# Patient Record
Sex: FEMALE | Race: WHITE | NOT HISPANIC OR LATINO | Employment: UNEMPLOYED | URBAN - METROPOLITAN AREA
[De-identification: names, ages, dates, MRNs, and addresses within clinical notes are randomized per-mention and may not be internally consistent; named-entity substitution may affect disease eponyms.]

---

## 2023-08-04 ENCOUNTER — HOSPITAL ENCOUNTER (EMERGENCY)
Facility: HOSPITAL | Age: 53
Discharge: HOME | End: 2023-08-04
Attending: STUDENT IN AN ORGANIZED HEALTH CARE EDUCATION/TRAINING PROGRAM
Payer: MEDICAID

## 2023-08-04 VITALS
TEMPERATURE: 100 F | RESPIRATION RATE: 18 BRPM | SYSTOLIC BLOOD PRESSURE: 113 MMHG | HEART RATE: 92 BPM | OXYGEN SATURATION: 100 % | HEIGHT: 61 IN | WEIGHT: 120 LBS | BODY MASS INDEX: 22.66 KG/M2 | DIASTOLIC BLOOD PRESSURE: 73 MMHG

## 2023-08-04 DIAGNOSIS — K80.20 CHOLELITHIASIS WITHOUT CHOLECYSTITIS: ICD-10-CM

## 2023-08-04 DIAGNOSIS — R10.11 RUQ ABDOMINAL PAIN: Primary | ICD-10-CM

## 2023-08-04 DIAGNOSIS — K74.69 OTHER CIRRHOSIS OF LIVER: ICD-10-CM

## 2023-08-04 DIAGNOSIS — K59.00 CONSTIPATION, UNSPECIFIED CONSTIPATION TYPE: ICD-10-CM

## 2023-08-04 LAB
ALBUMIN SERPL-MCNC: 3.9 G/DL (ref 3.5–5)
ALBUMIN/GLOB SERPL: 1.1 RATIO (ref 1.1–2)
ALP SERPL-CCNC: 153 UNIT/L (ref 40–150)
ALT SERPL-CCNC: 24 UNIT/L (ref 0–55)
APPEARANCE UR: ABNORMAL
APTT PPP: 31.5 SECONDS
AST SERPL-CCNC: 40 UNIT/L (ref 5–34)
BACTERIA #/AREA URNS AUTO: ABNORMAL /HPF
BASOPHILS # BLD AUTO: 0.01 X10(3)/MCL
BASOPHILS NFR BLD AUTO: 0.2 %
BILIRUB UR QL STRIP.AUTO: NEGATIVE
BILIRUBIN DIRECT+TOT PNL SERPL-MCNC: 1 MG/DL
BUN SERPL-MCNC: 14.9 MG/DL (ref 9.8–20.1)
C TRACH DNA SPEC QL NAA+PROBE: NOT DETECTED
CALCIUM SERPL-MCNC: 9.3 MG/DL (ref 8.4–10.2)
CHLORIDE SERPL-SCNC: 107 MMOL/L (ref 98–107)
CO2 SERPL-SCNC: 23 MMOL/L (ref 22–29)
COLOR UR: YELLOW
CREAT SERPL-MCNC: 0.63 MG/DL (ref 0.55–1.02)
EOSINOPHIL # BLD AUTO: 0.02 X10(3)/MCL (ref 0–0.9)
EOSINOPHIL NFR BLD AUTO: 0.3 %
ERYTHROCYTE [DISTWIDTH] IN BLOOD BY AUTOMATED COUNT: 19.1 % (ref 11.5–17)
GFR SERPLBLD CREATININE-BSD FMLA CKD-EPI: >60 MLS/MIN/1.73/M2
GLOBULIN SER-MCNC: 3.4 GM/DL (ref 2.4–3.5)
GLUCOSE SERPL-MCNC: 110 MG/DL (ref 74–100)
GLUCOSE UR QL STRIP.AUTO: NORMAL
HCT VFR BLD AUTO: 36.6 % (ref 37–47)
HGB BLD-MCNC: 10.8 G/DL (ref 12–16)
HYALINE CASTS #/AREA URNS LPF: ABNORMAL /LPF
IMM GRANULOCYTES # BLD AUTO: 0.02 X10(3)/MCL (ref 0–0.04)
IMM GRANULOCYTES NFR BLD AUTO: 0.3 %
INR PPP: 1.1
KETONES UR QL STRIP.AUTO: NEGATIVE
LACTATE SERPL-SCNC: 1.2 MMOL/L (ref 0.5–2.2)
LEUKOCYTE ESTERASE UR QL STRIP.AUTO: 250
LIPASE SERPL-CCNC: 16 U/L
LYMPHOCYTES # BLD AUTO: 0.54 X10(3)/MCL (ref 0.6–4.6)
LYMPHOCYTES NFR BLD AUTO: 8.3 %
MCH RBC QN AUTO: 23.2 PG (ref 27–31)
MCHC RBC AUTO-ENTMCNC: 29.5 G/DL (ref 33–36)
MCV RBC AUTO: 78.5 FL (ref 80–94)
MONOCYTES # BLD AUTO: 0.46 X10(3)/MCL (ref 0.1–1.3)
MONOCYTES NFR BLD AUTO: 7.1 %
MUCOUS THREADS URNS QL MICRO: ABNORMAL /LPF
N GONORRHOEA DNA SPEC QL NAA+PROBE: NOT DETECTED
NEUTROPHILS # BLD AUTO: 5.45 X10(3)/MCL (ref 2.1–9.2)
NEUTROPHILS NFR BLD AUTO: 83.8 %
NITRITE UR QL STRIP.AUTO: NEGATIVE
NRBC BLD AUTO-RTO: 0 %
PH UR STRIP.AUTO: 6 [PH]
PLATELET # BLD AUTO: 174 X10(3)/MCL (ref 130–400)
PMV BLD AUTO: 9.7 FL (ref 7.4–10.4)
POTASSIUM SERPL-SCNC: 4.1 MMOL/L (ref 3.5–5.1)
PROT SERPL-MCNC: 7.3 GM/DL (ref 6.4–8.3)
PROT UR QL STRIP.AUTO: ABNORMAL
PROTHROMBIN TIME: 13.9 SECONDS (ref 11.4–14)
RBC # BLD AUTO: 4.66 X10(6)/MCL (ref 4.2–5.4)
RBC #/AREA URNS AUTO: ABNORMAL /HPF
RBC UR QL AUTO: NEGATIVE
SODIUM SERPL-SCNC: 140 MMOL/L (ref 136–145)
SOURCE (OHS): NORMAL
SP GR UR STRIP.AUTO: 1.03
SQUAMOUS #/AREA URNS LPF: ABNORMAL /HPF
TROPONIN I SERPL-MCNC: <0.01 NG/ML (ref 0–0.04)
UROBILINOGEN UR STRIP-ACNC: ABNORMAL
WBC # SPEC AUTO: 6.5 X10(3)/MCL (ref 4.5–11.5)
WBC #/AREA URNS AUTO: ABNORMAL /HPF

## 2023-08-04 PROCEDURE — 87040 BLOOD CULTURE FOR BACTERIA: CPT | Performed by: PHYSICIAN ASSISTANT

## 2023-08-04 PROCEDURE — 87591 N.GONORRHOEAE DNA AMP PROB: CPT | Performed by: PHYSICIAN ASSISTANT

## 2023-08-04 PROCEDURE — 25000003 PHARM REV CODE 250: Performed by: PHYSICIAN ASSISTANT

## 2023-08-04 PROCEDURE — 83605 ASSAY OF LACTIC ACID: CPT | Performed by: PHYSICIAN ASSISTANT

## 2023-08-04 PROCEDURE — 96375 TX/PRO/DX INJ NEW DRUG ADDON: CPT

## 2023-08-04 PROCEDURE — 99285 EMERGENCY DEPT VISIT HI MDM: CPT | Mod: 25

## 2023-08-04 PROCEDURE — 85025 COMPLETE CBC W/AUTO DIFF WBC: CPT | Performed by: PHYSICIAN ASSISTANT

## 2023-08-04 PROCEDURE — 85730 THROMBOPLASTIN TIME PARTIAL: CPT | Performed by: PHYSICIAN ASSISTANT

## 2023-08-04 PROCEDURE — 96374 THER/PROPH/DIAG INJ IV PUSH: CPT

## 2023-08-04 PROCEDURE — 85610 PROTHROMBIN TIME: CPT | Performed by: PHYSICIAN ASSISTANT

## 2023-08-04 PROCEDURE — 96361 HYDRATE IV INFUSION ADD-ON: CPT

## 2023-08-04 PROCEDURE — 63600175 PHARM REV CODE 636 W HCPCS: Performed by: PHYSICIAN ASSISTANT

## 2023-08-04 PROCEDURE — 93005 ELECTROCARDIOGRAM TRACING: CPT

## 2023-08-04 PROCEDURE — 83690 ASSAY OF LIPASE: CPT | Performed by: PHYSICIAN ASSISTANT

## 2023-08-04 PROCEDURE — 25500020 PHARM REV CODE 255: Performed by: PHYSICIAN ASSISTANT

## 2023-08-04 PROCEDURE — 80053 COMPREHEN METABOLIC PANEL: CPT | Performed by: PHYSICIAN ASSISTANT

## 2023-08-04 PROCEDURE — 84484 ASSAY OF TROPONIN QUANT: CPT | Performed by: PHYSICIAN ASSISTANT

## 2023-08-04 PROCEDURE — 81001 URINALYSIS AUTO W/SCOPE: CPT | Performed by: PHYSICIAN ASSISTANT

## 2023-08-04 RX ORDER — KETOROLAC TROMETHAMINE 30 MG/ML
15 INJECTION, SOLUTION INTRAMUSCULAR; INTRAVENOUS
Status: COMPLETED | OUTPATIENT
Start: 2023-08-04 | End: 2023-08-04

## 2023-08-04 RX ORDER — DICYCLOMINE HYDROCHLORIDE 10 MG/1
20 CAPSULE ORAL
Status: COMPLETED | OUTPATIENT
Start: 2023-08-04 | End: 2023-08-04

## 2023-08-04 RX ORDER — MORPHINE SULFATE 2 MG/ML
6 INJECTION, SOLUTION INTRAMUSCULAR; INTRAVENOUS ONCE
Status: DISCONTINUED | OUTPATIENT
Start: 2023-08-04 | End: 2023-08-04

## 2023-08-04 RX ORDER — ONDANSETRON 4 MG/1
4 TABLET, ORALLY DISINTEGRATING ORAL EVERY 8 HOURS PRN
Qty: 16 TABLET | Refills: 0 | OUTPATIENT
Start: 2023-08-04 | End: 2023-08-18

## 2023-08-04 RX ORDER — HYDROCODONE BITARTRATE AND ACETAMINOPHEN 5; 325 MG/1; MG/1
1 TABLET ORAL
Status: COMPLETED | OUTPATIENT
Start: 2023-08-04 | End: 2023-08-04

## 2023-08-04 RX ORDER — MORPHINE SULFATE 2 MG/ML
4 INJECTION, SOLUTION INTRAMUSCULAR; INTRAVENOUS
Status: COMPLETED | OUTPATIENT
Start: 2023-08-04 | End: 2023-08-04

## 2023-08-04 RX ORDER — LACTULOSE 10 G/15ML
20 SOLUTION ORAL 2 TIMES DAILY
Qty: 300 ML | Refills: 0 | Status: SHIPPED | OUTPATIENT
Start: 2023-08-04 | End: 2023-09-06 | Stop reason: DRUGHIGH

## 2023-08-04 RX ORDER — DICYCLOMINE HYDROCHLORIDE 20 MG/1
20 TABLET ORAL
Qty: 40 TABLET | Refills: 0 | Status: SHIPPED | OUTPATIENT
Start: 2023-08-04 | End: 2023-09-06 | Stop reason: ALTCHOICE

## 2023-08-04 RX ORDER — LACTULOSE 10 G/15ML
20 SOLUTION ORAL
Status: COMPLETED | OUTPATIENT
Start: 2023-08-04 | End: 2023-08-04

## 2023-08-04 RX ADMIN — LACTULOSE 20 G: 20 SOLUTION ORAL at 07:08

## 2023-08-04 RX ADMIN — IOHEXOL 100 ML: 350 INJECTION, SOLUTION INTRAVENOUS at 02:08

## 2023-08-04 RX ADMIN — HYDROCODONE BITARTRATE AND ACETAMINOPHEN 1 TABLET: 5; 325 TABLET ORAL at 12:08

## 2023-08-04 RX ADMIN — MORPHINE SULFATE 4 MG: 2 INJECTION, SOLUTION INTRAMUSCULAR; INTRAVENOUS at 03:08

## 2023-08-04 RX ADMIN — SODIUM CHLORIDE, POTASSIUM CHLORIDE, SODIUM LACTATE AND CALCIUM CHLORIDE 1000 ML: 600; 310; 30; 20 INJECTION, SOLUTION INTRAVENOUS at 11:08

## 2023-08-04 RX ADMIN — DICYCLOMINE HYDROCHLORIDE 20 MG: 10 CAPSULE ORAL at 07:08

## 2023-08-04 RX ADMIN — SODIUM CHLORIDE, POTASSIUM CHLORIDE, SODIUM LACTATE AND CALCIUM CHLORIDE 1000 ML: 600; 310; 30; 20 INJECTION, SOLUTION INTRAVENOUS at 03:08

## 2023-08-04 RX ADMIN — KETOROLAC TROMETHAMINE 15 MG: 30 INJECTION, SOLUTION INTRAMUSCULAR; INTRAVENOUS at 11:08

## 2023-08-04 NOTE — DISCHARGE INSTRUCTIONS
Take Bentyl before eating to help prevent abdominal pain.  Take lactulose daily until constipation resolves.  Surgery will call you to schedule an appointment for follow up in clinic.  You have an appointment with internal medicine clinic to establish a primary care physician on September 6th at 9:00 a.m..  After reviewing abdominal CT, radiologist recommends follow-up with CT scan liver mass protocol in 3 months.  Your primary care provider once established can order this outpatient imaging. They will call you if they can see you sooner.  Return to the emergency department if symptoms worsen.

## 2023-08-04 NOTE — ED PROVIDER NOTES
Encounter Date: 8/4/2023       History     Chief Complaint   Patient presents with    Emesis    Flank Pain     PT IN /AASI W CO BILAT FLANK PAIN W DYSURIA X 1 WK. STARTED VOMITING TODAY.  REPORTS HX OF CIRRHOSIS, LUPUS AND COPD.  CBG IN R[address]TE [address],  4MG ZOFRAN GIVEN IM, PTA.      52-year-old female with a history of cirrhosis, lupus and COPD, presents to the emergency department with complaints of bilateral flank pain, dysuria, generalized abdominal pain with the worst pain being in her right upper quadrant that radiates to her back x 1 week and vomiting that began today.  4 mg of Zofran IM given in route.  Patient states she recently [place] here [place] of state and has not established a primary care provider or a GI doctor in [place].  She rates her pain 8/10 and constant.  She denies fever, chills, diarrhea, shortness of breath, chest pain, dizziness.      The history is provided by the patient. No  was used.     Review of patient's allergies indicates:   Allergen Reactions    Gabapentin Swelling     History reviewed. No pertinent past medical history.  History reviewed. No pertinent surgical history.  History reviewed. No pertinent family history.  Social History     Tobacco Use    Smoking status: Every Day     Current packs/day: 0.00     Types: Cigarettes    Smokeless tobacco: Never   Substance Use Topics    Alcohol use: Not Currently     Comment: QUIT X 1 MONTH.    Drug use: Yes     Types: Marijuana     Review of Systems   Constitutional:  Negative for chills and fever.   Eyes: Negative.    Respiratory:  Negative for cough, chest tightness and shortness of breath.    Cardiovascular:  Negative for chest pain and palpitations.   Gastrointestinal:  Positive for abdominal pain, nausea and vomiting. Negative for diarrhea.   Genitourinary:  Positive for dysuria and flank pain (Bilateral). Negative for decreased urine volume.   Musculoskeletal:  Negative for back pain.   Skin:  Negative for rash  and wound.   Neurological:  Negative for dizziness, light-headedness and headaches.       Physical Exam     Initial Vitals [08/04/23 1037]   BP Pulse Resp Temp SpO2   113/73 92 18 99.5 °F (37.5 °C) 100 %      MAP       --         Physical Exam    Nursing note and vitals reviewed.  Constitutional: She appears well-developed and well-nourished.   HENT:   Head: Normocephalic and atraumatic.   Nose: Nose normal.   Mouth/Throat: Oropharynx is clear and moist.   Eyes: Conjunctivae are normal.   Neck: Neck supple.   Normal range of motion.  Cardiovascular:  Normal rate, regular rhythm, normal heart sounds and intact distal pulses.           Pulmonary/Chest: Breath sounds normal. No respiratory distress. She has no wheezes.   Abdominal: Abdomen is soft. Bowel sounds are normal. She exhibits distension (Ascites). There is abdominal tenderness (Generalized abdominal pain with the worst and right upper quadrant). There is no rebound and no guarding.   Musculoskeletal:         General: Normal range of motion.      Cervical back: Normal range of motion and neck supple.     Neurological: She is alert and oriented to person, place, and time. GCS score is 15. GCS eye subscore is 4. GCS verbal subscore is 5. GCS motor subscore is 6.   Skin: Skin is warm. Capillary refill takes less than 2 seconds.         ED Course   Procedures  Labs Reviewed   COMPREHENSIVE METABOLIC PANEL - Abnormal; Notable for the following components:       Result Value    Glucose Level 110 (*)     Alkaline Phosphatase 153 (*)     Aspartate Aminotransferase 40 (*)     All other components within normal limits   URINALYSIS, REFLEX TO URINE CULTURE - Abnormal; Notable for the following components:    Appearance, UA Turbid (*)     Protein, UA 1+ (*)     Urobilinogen, UA 2+ (*)     Leukocyte Esterase,  (*)     Bacteria, UA Few (*)     Squamous Epithelial Cells, UA Many (*)     Mucous, UA Few (*)     Hyaline Casts, UA 0-2 (*)     All other components within  normal limits   CBC WITH DIFFERENTIAL - Abnormal; Notable for the following components:    Hgb 10.8 (*)     Hct 36.6 (*)     MCV 78.5 (*)     MCH 23.2 (*)     MCHC 29.5 (*)     RDW 19.1 (*)     Lymph # 0.54 (*)     All other components within normal limits   LIPASE - Normal   APTT - Normal   PROTIME-INR - Normal   TROPONIN I - Normal   LACTIC ACID, PLASMA - Normal   CHLAMYDIA/GONORRHOEAE(GC), PCR    Narrative:     The Xpert CT/NG test, performed on the [employer] system is a qualitative in vitro real-time polymerase chain reaction (PCR) test for the automated detected and differentiation for genomic DNA from Chlamydia trachomatis (CT) and/or Neisseria gonorrhoeae (NG).   BLOOD CULTURE [facility]   BLOOD CULTURE [facility]   CBC W/ AUTO DIFFERENTIAL    Narrative:     The following orders were created for panel order CBC auto differential.  Procedure                               Abnormality         Status                     ---------                               -----------         ------                     CBC with Differential[[id number]]        Abnormal            Final result                 Please view results for these tests on the individual orders.   EXTRA TUBES    Narrative:     The following orders were created for panel order EXTRA TUBES.  Procedure                               Abnormality         Status                     ---------                               -----------         ------                     Red Top Hold[[id number]]                                     In process                 Lavender Top Hold[[id number]]                                In process                 Gold Top Hold[[id number]]                                    In process                 Pink Top Hold[[id number]]                                    In process                   Please view results for these tests on the individual orders.   RED TOP HOLD   LAVENDER TOP HOLD   GOLD TOP HOLD   PINK TOP HOLD     EKG Readings: (Independently  Interpreted)   Initial Reading: No STEMI. Rhythm: Normal Sinus Rhythm. Heart Rate: 90.   Nonspecific ST abnormality     ECG Results              EKG 12-lead (In process)  Result time 08/04/23 11:18:26      In process by Interface, Lab In [facility] (08/04/23 11:18:26)                   Narrative:    Test Reason : R10.9,    Vent. Rate : 090 BPM     Atrial Rate : 090 BPM     P-R Int : 144 ms          QRS Dur : 064 ms      QT Int : 376 ms       P-R-T Axes : 085 076 060 degrees     QTc Int : 459 ms    Normal sinus rhythm  Nonspecific ST abnormality  Abnormal ECG  No previous ECGs available    Referred By:             Confirmed By:                                   Imaging Results              US Abdomen Limited (Final result)  Result time 08/04/23 16:57:44      Final result by Paul Hoover MD (08/04/23 16:57:44)                   Impression:      1. Cholelithiasis without convincing sonographic evidence of acute cholecystitis.  2. No biliary ductal dilatation.  3. Cirrhosis with trace perihepatic fluid.      Electronically signed by: Paul Hoover  Date:    08/04/2023  Time:    16:57               Narrative:    EXAMINATION:  US ABDOMEN LIMITED    CLINICAL HISTORY:  Suspicious for acute cholecystitis on CT;    COMPARISON:  Concurrent CT.    FINDINGS:  Grayscale, color and spectral doppler evaluation of the right upper quadrant.    The pancreas is obscured.  Imaged portions of IV 6 normal in caliber.    Liver is upper limit of normal in size.  There is coarsened hepatic echotexture.  There is surface lobulation.  Normal hepatopetal flow is noted in the portal vein.    There are gallstones.  The gallbladder is distended but no significant gallbladder wall thickening.  The common bile duct is normal in caliber  and measures 5 mm.    Right kidney measures 10 cm in length. No hydronephrosis.    Trace perihepatic fluid.                                       CT Abdomen Pelvis With Contrast (Final result)  Result time 08/04/23  14:40:18      Final result by Argenis Feliciano MD (08/04/23 14:40:18)                   Impression:      Distended gallbladder with pericholecystic fluid and multiple gallstones.  Findings are suspicious for acute cholecystitis.  Ultrasound correlation may be beneficial    Enlarged nodular appearing liver consistent patient's history of cirrhosis.  Follow-up is recommended with CT scan liver mass protocol in 3 months.    1 cm x 1.1 cm nodule in the left adrenal gland which has indeterminate Hounsfield units.  This can be followed up with CT scan suggested above.      Electronically signed by: Argenis Feliciano  Date:    08/04/2023  Time:    14:40               Narrative:    EXAMINATION:  CT ABDOMEN PELVIS WITH CONTRAST    CLINICAL HISTORY:  Abdominal pain, acute, nonlocalized;hx of cirrhosis, generalized abdominal pain, vomiting;    TECHNIQUE:  Low dose axial images, sagittal and coronal reformations were obtained from the lung bases to the pubic symphysis following the IV administration of contrast. Automatic exposure control (AEC) is utilized to reduce patient radiation exposure.    COMPARISON:  None.    FINDINGS:  The lung bases are clear.    Liver is enlarged in size.  There are multiple areas of associated nodularity in the liver likely due to the patient's cirrhosis and regenerating nodules.  A focal dominant nodule is not seen.  Follow-up of this is recommended with CT scan liver mass protocol.    The gallbladder is distended.  There is pericholecystic fluid seen.  There are gallstones seen in the gallbladder.  If gallbladder pathology is suspected ultrasound correlation is recommended.    There is a small hiatal hernia.    The pancreas appears normal.  No pancreatic mass or lesion is seen.    The spleen shows no acute abnormality.    There is a 1 cm x 1.1 cm nodule in the left adrenal gland.  Hounsfield units are indeterminate..    The kidneys appear normal.  No hydronephrosis is seen.  No  hydroureter is seen.  No nephrolithiasis is seen.  No obvious ureteral stones are seen.    Urinary bladder appears grossly unremarkable.    No colitis is seen.  No diverticulitis is seen.  No obvious colonic mass or lesion is seen.    No free air is seen.  No free fluid is seen.                                       X-Ray Chest PA And Lateral (Final result)  Result time 08/04/23 11:50:14      Final result by Saeid Rodriguez MD (08/04/23 11:50:14)                   Impression:      No acute chest disease is identified.      Electronically signed by: Saeid Rodriguez  Date:    08/04/2023  Time:    11:50               Narrative:    EXAMINATION:  XR CHEST PA AND LATERAL    CLINICAL HISTORY:  flank pain;, .    FINDINGS:  No alveolar consolidation, effusion, or pneumothorax is seen.   The thoracic aorta is normal  cardiac silhouette, central pulmonary vessels and mediastinum are normal in size and are grossly unremarkable.   visualized osseous structures are grossly unremarkable..    Calcified granuloma identified on the right                                       Medications   lactated ringers bolus 1,000 mL (0 mLs Intravenous Stopped 8/4/23 1204)   ketorolac injection 15 mg (15 mg Intravenous Given 8/4/23 1131)   HYDROcodone-acetaminophen 5-325 mg per tablet 1 tablet (1 tablet Oral Given 8/4/23 1243)   iohexoL (OMNIPAQUE 350) injection 100 mL (100 mLs Intravenous Given 8/4/23 1429)   lactated ringers bolus 999 mL (0 mLs Intravenous Stopped 8/4/23 1630)   morphine injection 4 mg (4 mg Intravenous Given 8/4/23 1528)   lactulose 20 gram/30 mL solution Soln 20 g (20 g Oral Given 8/4/23 1906)   dicyclomine capsule 20 mg (20 mg Oral Given 8/4/23 1906)     Medical Decision Making:   Initial Assessment:   52-year-old female with a history of cirrhosis, lupus and COPD, presents to the emergency department with complaints of bilateral flank pain, dysuria, generalized abdominal pain with the worst pain being in her right  upper quadrant that radiates to her back x 1 week and vomiting that began today.    Differential Diagnosis:   Worsening ascites  Cholecystitis  Symptomatic cholelithiasis  UTI  Kidney stone  Pyelonephritis  STI  Peritonitis  Independently Interpreted Test(s):   I have ordered and independently interpreted X-rays - see summary below.       <> Summary of X-Ray Reading(s): Agree with radiology report, no acute chest findings, no infiltrates  I have ordered and independently interpreted EKG Reading(s) - see summary below       <> Summary of EKG Reading(s): HR: 90, No STEMI, normal sinus rhythm, nonspecific ST abnormality  Clinical Tests:   Lab Tests: Reviewed and Ordered  The following lab test(s) were unremarkable: Lipase, Lactate, Troponin, PTT, PT and Urinalysis       <> Summary of Lab: AST: 40, alkaline phosphatase: 153 otherwise CMP unremarkable    CBC shows mild anemia    Urinalysis:  250 leukocytes, few bacteria    Negative gonorrhea and chlamydia  Radiological Study: Ordered and Reviewed  Medical Tests: Ordered and Reviewed  ED Management:  X-ray chest:  No acute chest disease is identified    CT abdomen and pelvis with contrast:Distended gallbladder with pericholecystic fluid and multiple gallstones.  Findings are suspicious for acute cholecystitis.  Ultrasound correlation may be beneficial     Enlarged nodular appearing liver consistent patient's history of cirrhosis.  Follow-up is recommended with CT scan liver mass protocol in 3 months.     1 cm x 1.1 cm nodule in the left adrenal gland which has indeterminate Hounsfield units.  This can be followed up with CT scan suggested above.    Ultrasound abdomen limited ordered for further evaluation due to possible acute cholecystitis    General surgery consulted at 1518     US abdomen limited: Cholelithiasis without convincing sonographic evidence of acute cholecystitis. No biliary ductal dilatation. Cirrhosis with trace perihepatic fluid.    After review of  imaging and further evaluation, Surgery states she has cholelithiasis without cholecystitis.  They will call her to schedule an appointment for outpatient follow up in clinic.    Our  Manuela, scheduled her an appointment on September 6th with Internal Medicine Clinic to establish a primary care provider.  Her primary care provider can order outpatient CT scan liver mass protocol in 3 months per recommendation from Radiology.      Prescribed lactulose for constipation and Bentyl for abdominal discomfort.  Will avoid opioids due to constipation burden.    Discussed strict ED precautions.  Other:   I have discussed this case with another health care provider.       <> Summary of the Discussion: I consulted Dr. Joseph for a face-to-face evaluation with the patient prior to consulting surgery.  The patient is resting comfortably and in no acute distress.  She states that her symptoms have improved after treatment in Emergency Department. I personally discussed her test results and treatment plan.  Gave strict ED precautions, discussed specific conditions for return to the emergency department and importance of follow up with her primary care provider and surgery.  Patient voices understanding and agrees to the plan discussed. All patients' questions have been answered at this time.   She has remained hemodynamically stable throughout entire stay in ED and is stable for discharge home.               ED Course as of 08/04/23 1946   Fri Aug 04, 2023   1215 Leukocytes, UA(!): 250 [ER]   1215 Bacteria, UA(!): Few [ER]   1216 X-Ray Chest PA And Lateral  No acute chest disease is identified. [ER]   1450 CT Abdomen Pelvis With Contrast  Distended gallbladder with pericholecystic fluid and multiple gallstones.  Findings are suspicious for acute cholecystitis.  Ultrasound correlation may be beneficial     Enlarged nodular appearing liver consistent patient's history of cirrhosis.  Follow-up is recommended with CT scan  liver mass protocol in 3 months.     1 cm x 1.1 cm nodule in the left adrenal gland which has indeterminate Hounsfield units.  This can be followed up with CT scan suggested above. [ER]   1518 Consulted General surgery for acute cholecystitis [ER]   1637 AST(!): 40 [ER]   1637 Alkaline Phosphatase(!): 153 [ER]   1654 Awaiting ultrasound results to discuss the plan with the surgery team. [ER]   1709 US Abdomen Limited  1. Cholelithiasis without convincing sonographic evidence of acute cholecystitis.  2. No biliary ductal dilatation.  3. Cirrhosis with trace perihepatic fluid.    [ER]   1747 Awaiting for Surgery to re evaluate the patient and give further recommendations  [ER]      ED Course User Index  [ER] Noa Gardner PA                   Clinical Impression:   Final diagnoses:  [R10.11] RUQ abdominal pain (Primary)  [K59.00] Constipation, unspecified constipation type  [K74.69] Other cirrhosis of liver  [K80.20] Cholelithiasis without cholecystitis        ED Disposition Condition    Discharge Stable          ED Prescriptions       Medication Sig Dispense Start Date End Date Auth. Provider    lactulose (CHRONULAC) 20 gram/30 mL Soln Take 30 mLs (20 g total) by mouth 2 (two) times daily. for 5 days 300 mL 8/4/2023 8/9/2023 Noa Gardner PA    dicyclomine (BENTYL) 20 mg tablet Take 1 tablet (20 mg total) by mouth 4 (four) times daily before meals and nightly. for 10 days 40 tablet 8/4/2023 8/14/2023 Noa Gardner PA    ondansetron (ZOFRAN-ODT) 4 MG TbDL Take 1 tablet (4 mg total) by mouth every 8 (eight) hours as needed (nausea). 16 tablet 8/4/2023 -- Noa Gardner PA          Follow-up Information       Follow up With Specialties Details Why Contact Info Additional Information    [facility] - Internal Medicine Internal Medicine On 9/6/2023 @ 9:00 am [address] [facility] [id number] [phone] Internal Medicine Clinic Entrance #1    [facility] - Emergency Dept  Emergency Medicine  As needed, If symptoms worsen [address] [facility] [id number] [phone]     [facility] - General Surgery Services General Surgery  They will call you to schedule an apointment [address] [facility] [id number] [phone]              Noa Gardner PA  08/04/23 1946

## 2023-08-04 NOTE — MEDICAL/APP STUDENT
\[facility]\"" General Surgery Service  ADMIT / CONSULT / H&P NOTE  Date: 8/4/2023    CC: Emesis and flank pain     HPI:  Araseli Decker is a 52 y.o. female who presented to the ED 8/4/23 for evaluation of emesis and b/l flank pain. She reports the pain began 1 week ago, which she believed to be related to her kidneys. The pain began in the epigastric region/RUQ then spread to her right flank/back, occasionally involving her left flank and bilateral shoulders as well. Her last bowel movement was today, which was soft. Reports intermittent urination of small volume. Reports experiencing an episode of chest pain 2 days ago. Denies taking any blood thinners. History of heavy alcohol use resulting in cirrhosis for which she has a lifetime paracentesis order (last performed June 2023); last drank alcohol 1 month ago. Reports feeling confused lately. States she has had abdominal, flank, and shoulder pain in the past, which is most severe when her abdomen is distended and she is in need of paracentesis. States her last MELD score was 19.         ROS:  10 point ROS negative unless stated in HPI.     PMH:   SLE, cirrhosis (lifetime paracentesis order, last done in June 2023), COPD (prescribed 3 inhalers, which she has not been using recently), lung nodule, left Bakers cyst (reports it has been moving)    PSH:   History reviewed. No pertinent surgical history.    FamHx:   History reviewed. No pertinent family history.    SocHx:  Social History     Socioeconomic History    Marital status:    Tobacco Use    Smoking status: Every Day     Current packs/day: 0.50 (previously more)     Types: Cigarettes    Smokeless tobacco: Never   Substance and Sexual Activity    Alcohol use: Not Currently (previous heavy use)     Comment: QUIT X 1 MONTH.    Drug use: Yes     Types: Marijuana   From [place]. Currently staying at a shelter in [place]. Seeking to establish medical care in [place] as she would like to stay here.     Allergies:   Review of  "patient's allergies indicates:   Allergen Reactions    Gabapentin Swelling       Medications:  Spironolactone, protonix, thiamine, xanax        Objective:    VITAL SIGNS: 24 HR MIN & MAX LAST    Temp  Min: 99.5 °F (37.5 °C)  Max: 99.5 °F (37.5 °C)  99.5 °F (37.5 °C)        BP  Min: 113/73  Max: 113/73  113/73     Pulse  Min: 92  Max: 92  92     Resp  Min: 16  Max: 18  18    SpO2  Min: 100 %  Max: 100 %  100 %      HT: 5' 1" (154.9 cm)  WT: 54.4 kg (120 lb)  BMI: 22.7       Physical Exam:  Gen: NAD, AAOx3  Eyes: EOMI, sclera clear, lids normal  CV: extremities wwp, regular rate, palpable radials  Pulm: nonlabored, no increased wob, equal chest rise b/l, audible wheeze on exhalation    Abd: soft, distended, generalized minimal tenderness to palpation, reducible umbilical hernia  MSK: no deformity, joint ROM intact across all extremities  Skin: No rashes, wounds, or infections noted  Psych: Normal mood/affect. Judgement and insight intact.    Results  Recent Labs   Lab 08/04/23  1112   WBC 6.50   HGB 10.8*   HCT 36.6*      INR 1.1      K 4.1   CHLORIDE 107   CO2 23   BUN 14.9   CREATININE 0.63   GLUCOSE 110*   CALCIUM 9.3   ALKPHOS 153*   LIPASE 16   ALT: 24  AST: 40  Lactate: 1.2  Troponin I: <0.010  PT: 13.9  INR: 1.1  PTT: 31.5        Imaging/Other:  CT Abdomen Pelvis with Contrast 8/4/23:  Impression:  - Distended gallbladder with pericholecystic fluid and multiple gallstones.  Findings are suspicious for acute cholecystitis.  Ultrasound correlation may be beneficial  - Enlarged nodular appearing liver consistent patient's history of cirrhosis.  Follow-up is recommended with CT scan liver mass protocol in 3 months.  - 1 cm x 1.1 cm nodule in the left adrenal gland which has indeterminate Hounsfield units.  This can be followed up with CT scan suggested above.    CXR 8/4/23: No acute chest disease is identified.    EKG 8/4/23:  Normal sinus rhythm   Nonspecific ST abnormality   Abnormal ECG   No " previous ECGs available     A/P:   52 y.o. female presented to the ED 8/4/23 for evaluation of emesis and generalized abdominal/bilateral flank pain.     ***    Sp Stephen  [facility] Medical Student

## 2023-08-04 NOTE — CONSULTS
[facility] General Surgery Service  ADMIT / CONSULT / H&P NOTE  Date: 8/4/2023    CC: Emesis and flank pain     HPI:  Araseli Decker is a 52 y.o. female who presented to the ED 8/4/23 for evaluation of emesis and b/l flank pain. She reports the pain began 1 week ago, which she believed to be related to her kidneys. The pain began in the epigastric region/RUQ then spread to her right flank/back, occasionally involving her left flank and bilateral shoulders as well. Her last bowel movement was today, which was soft. Reports intermittent urination of small volume. Reports experiencing an episode of chest pain 2 days ago. Denies taking any blood thinners. History of heavy alcohol use resulting in cirrhosis for which she has a lifetime paracentesis order (last performed June 2023); last drank alcohol 1 month ago. Reports feeling confused lately. States she has had abdominal, flank, and shoulder pain in the past, which is most severe when her abdomen is distended and she is in need of paracentesis. States her last MELD score was 19.         ROS:  10 point ROS negative unless stated in HPI.     PMH:   SLE, cirrhosis (lifetime paracentesis order, last done in June 2023), COPD (prescribed 3 inhalers, which she has not been using recently), lung nodule, left Bakers cyst (reports it has been moving)    PSH:   History reviewed. No pertinent surgical history.    FamHx:   History reviewed. No pertinent family history.    SocHx:  Social History     Socioeconomic History    Marital status:    Tobacco Use    Smoking status: Every Day     Current packs/day: 0.50 (previously more)     Types: Cigarettes    Smokeless tobacco: Never   Substance and Sexual Activity    Alcohol use: Not Currently (previous heavy use)     Comment: QUIT X 1 MONTH.    Drug use: Yes     Types: Marijuana   From [place]. Currently staying at a shelter in [place]. Seeking to establish medical care in [place] as she would like to stay here.     Allergies:   Review of  "patient's allergies indicates:   Allergen Reactions    Gabapentin Swelling       Medications:  Spironolactone, protonix, thiamine, xanax        Objective:    VITAL SIGNS: 24 HR MIN & MAX LAST    Temp  Min: 99.5 °F (37.5 °C)  Max: 99.5 °F (37.5 °C)  99.5 °F (37.5 °C)        BP  Min: 113/73  Max: 113/73  113/73     Pulse  Min: 92  Max: 92  92     Resp  Min: 16  Max: 18  18    SpO2  Min: 100 %  Max: 100 %  100 %      HT: 5' 1" (154.9 cm)  WT: 54.4 kg (120 lb)  BMI: 22.7       Physical Exam:  Gen: NAD, AAOx3  Eyes: EOMI, sclera clear, lids normal  CV: extremities wwp, regular rate, palpable radials  Pulm: nonlabored, no increased wob, equal chest rise b/l, audible wheeze on exhalation    Abd: soft, distended, generalized minimal tenderness to palpation, reducible umbilical hernia  MSK: no deformity, joint ROM intact across all extremities  Skin: No rashes, wounds, or infections noted    Results  Recent Labs   Lab 08/04/23  1112   WBC 6.50   HGB 10.8*   HCT 36.6*      INR 1.1      K 4.1   CHLORIDE 107   CO2 23   BUN 14.9   CREATININE 0.63   GLUCOSE 110*   CALCIUM 9.3   ALKPHOS 153*   LIPASE 16     ALT: 24  AST: 40  Lactate: 1.2  Troponin I: <0.010  PT: 13.9  INR: 1.1  PTT: 31.5        Imaging/Other:  CT Abdomen Pelvis with Contrast 8/4/23:  Impression:  - Distended gallbladder with pericholecystic fluid and multiple gallstones.  Findings are suspicious for acute cholecystitis.  Ultrasound correlation may be beneficial  - Enlarged nodular appearing liver consistent patient's history of cirrhosis.  Follow-up is recommended with CT scan liver mass protocol in 3 months.  - 1 cm x 1.1 cm nodule in the left adrenal gland which has indeterminate Hounsfield units.  This can be followed up with CT scan suggested above.    CXR 8/4/23: No acute chest disease is identified.    EKG 8/4/23:  Normal sinus rhythm   Nonspecific ST abnormality   Abnormal ECG   No previous ECGs available     A/P:   52 y.o. female presented to " the ED 8/4/23 for evaluation of emesis and generalized abdominal/bilateral flank pain. Imaging shows dilated gallbladder, cholelithiasis without any convincing evidence of cholecystitis. She does have a significant history of liver cirrhosis with a history of multiple scheduled paracentesis and a MELD of 19, current MELD of 7. Her white count is normal, she has a normal bilirubin.    - patient does not have cholecystitis on clinical exam, she has a negative phan sign; further her imaging does not suggest cholecystitis, she does have liver cirrhosis and some perihepatic fluid  - Discharge per ED  - recommend primary care establishment to manage her multiple comorbidities  - will have patient follow up with us in clinic in a few weeks    Sp Stephen  [employer]    I have reviewed the note above; changes made where necessary. I concur with the documentation pertaining to Araseli Decker.    Eliseo Saldivar MD  [facility], [facility]

## 2023-08-04 NOTE — FIRST PROVIDER EVALUATION
Medical screening examination initiated.  I have conducted a focused provider triage encounter, findings are as follows:    Brief history of present illness:  Patient with pmhx of SLE, cirrhosis, COPD presents today c/o bilateral flank/flank pain as well as nausea/vomiting. She is currently staying at a shelter.     There were no vitals filed for this visit.    Pertinent physical exam:  Vitals stable. No jaundice.     Brief workup plan:  Labs     Preliminary workup initiated; this workup will be continued and followed by the physician or advanced practice provider that is assigned to the patient when roomed.

## 2023-08-07 ENCOUNTER — HOSPITAL ENCOUNTER (EMERGENCY)
Facility: HOSPITAL | Age: 53
Discharge: HOME | End: 2023-08-07
Attending: STUDENT IN AN ORGANIZED HEALTH CARE EDUCATION/TRAINING PROGRAM
Payer: MEDICAID

## 2023-08-07 VITALS
WEIGHT: 120 LBS | SYSTOLIC BLOOD PRESSURE: 127 MMHG | HEART RATE: 86 BPM | BODY MASS INDEX: 22.67 KG/M2 | DIASTOLIC BLOOD PRESSURE: 70 MMHG | TEMPERATURE: 98 F | RESPIRATION RATE: 18 BRPM | OXYGEN SATURATION: 100 %

## 2023-08-07 DIAGNOSIS — K80.20 CALCULUS OF GALLBLADDER WITHOUT CHOLECYSTITIS WITHOUT OBSTRUCTION: Primary | ICD-10-CM

## 2023-08-07 LAB
ALBUMIN SERPL-MCNC: 3.6 G/DL (ref 3.5–5)
ALBUMIN/GLOB SERPL: 1.2 RATIO (ref 1.1–2)
ALP SERPL-CCNC: 141 UNIT/L (ref 40–150)
ALT SERPL-CCNC: 19 UNIT/L (ref 0–55)
APPEARANCE UR: CLEAR
AST SERPL-CCNC: 30 UNIT/L (ref 5–34)
BACTERIA #/AREA URNS AUTO: ABNORMAL /HPF
BASOPHILS # BLD AUTO: 0.02 X10(3)/MCL
BASOPHILS NFR BLD AUTO: 0.6 %
BILIRUB UR QL STRIP.AUTO: NEGATIVE
BILIRUBIN DIRECT+TOT PNL SERPL-MCNC: 0.8 MG/DL
BUN SERPL-MCNC: 9.8 MG/DL (ref 9.8–20.1)
CALCIUM SERPL-MCNC: 9.4 MG/DL (ref 8.4–10.2)
CHLORIDE SERPL-SCNC: 113 MMOL/L (ref 98–107)
CO2 SERPL-SCNC: 21 MMOL/L (ref 22–29)
COLOR UR: YELLOW
CREAT SERPL-MCNC: 0.68 MG/DL (ref 0.55–1.02)
EOSINOPHIL # BLD AUTO: 0.09 X10(3)/MCL (ref 0–0.9)
EOSINOPHIL NFR BLD AUTO: 2.5 %
ERYTHROCYTE [DISTWIDTH] IN BLOOD BY AUTOMATED COUNT: 18.9 % (ref 11.5–17)
GFR SERPLBLD CREATININE-BSD FMLA CKD-EPI: >60 MLS/MIN/1.73/M2
GLOBULIN SER-MCNC: 3.1 GM/DL (ref 2.4–3.5)
GLUCOSE SERPL-MCNC: 93 MG/DL (ref 74–100)
GLUCOSE UR QL STRIP.AUTO: NORMAL
HCT VFR BLD AUTO: 33.2 % (ref 37–47)
HGB BLD-MCNC: 9.9 G/DL (ref 12–16)
HYALINE CASTS #/AREA URNS LPF: ABNORMAL /LPF
IMM GRANULOCYTES # BLD AUTO: 0.01 X10(3)/MCL (ref 0–0.04)
IMM GRANULOCYTES NFR BLD AUTO: 0.3 %
INR PPP: 1.1
KETONES UR QL STRIP.AUTO: NEGATIVE
LACTATE SERPL-SCNC: 1.3 MMOL/L (ref 0.5–2.2)
LEUKOCYTE ESTERASE UR QL STRIP.AUTO: NEGATIVE
LYMPHOCYTES # BLD AUTO: 1.31 X10(3)/MCL (ref 0.6–4.6)
LYMPHOCYTES NFR BLD AUTO: 36.6 %
MAGNESIUM SERPL-MCNC: 1.9 MG/DL (ref 1.6–2.6)
MCH RBC QN AUTO: 22.9 PG (ref 27–31)
MCHC RBC AUTO-ENTMCNC: 29.8 G/DL (ref 33–36)
MCV RBC AUTO: 76.9 FL (ref 80–94)
MONOCYTES # BLD AUTO: 0.41 X10(3)/MCL (ref 0.1–1.3)
MONOCYTES NFR BLD AUTO: 11.5 %
MUCOUS THREADS URNS QL MICRO: ABNORMAL /LPF
NEUTROPHILS # BLD AUTO: 1.74 X10(3)/MCL (ref 2.1–9.2)
NEUTROPHILS NFR BLD AUTO: 48.5 %
NITRITE UR QL STRIP.AUTO: NEGATIVE
NRBC BLD AUTO-RTO: 0 %
PH UR STRIP.AUTO: 6 [PH]
PLATELET # BLD AUTO: 139 X10(3)/MCL (ref 130–400)
PLATELETS.RETICULATED NFR BLD AUTO: 4.7 % (ref 0.9–11.2)
PMV BLD AUTO: 10.2 FL (ref 7.4–10.4)
POTASSIUM SERPL-SCNC: 4 MMOL/L (ref 3.5–5.1)
PROT SERPL-MCNC: 6.7 GM/DL (ref 6.4–8.3)
PROT UR QL STRIP.AUTO: ABNORMAL
PROTHROMBIN TIME: 14 SECONDS (ref 11.4–14)
RBC # BLD AUTO: 4.32 X10(6)/MCL (ref 4.2–5.4)
RBC #/AREA URNS AUTO: ABNORMAL /HPF
RBC UR QL AUTO: NEGATIVE
SODIUM SERPL-SCNC: 143 MMOL/L (ref 136–145)
SP GR UR STRIP.AUTO: 1.03
SQUAMOUS #/AREA URNS LPF: ABNORMAL /HPF
UROBILINOGEN UR STRIP-ACNC: ABNORMAL
WBC # SPEC AUTO: 3.58 X10(3)/MCL (ref 4.5–11.5)
WBC #/AREA URNS AUTO: ABNORMAL /HPF

## 2023-08-07 PROCEDURE — 85025 COMPLETE CBC W/AUTO DIFF WBC: CPT | Performed by: STUDENT IN AN ORGANIZED HEALTH CARE EDUCATION/TRAINING PROGRAM

## 2023-08-07 PROCEDURE — 63600175 PHARM REV CODE 636 W HCPCS: Performed by: STUDENT IN AN ORGANIZED HEALTH CARE EDUCATION/TRAINING PROGRAM

## 2023-08-07 PROCEDURE — 96372 THER/PROPH/DIAG INJ SC/IM: CPT | Performed by: STUDENT IN AN ORGANIZED HEALTH CARE EDUCATION/TRAINING PROGRAM

## 2023-08-07 PROCEDURE — 80053 COMPREHEN METABOLIC PANEL: CPT | Performed by: STUDENT IN AN ORGANIZED HEALTH CARE EDUCATION/TRAINING PROGRAM

## 2023-08-07 PROCEDURE — 81001 URINALYSIS AUTO W/SCOPE: CPT | Performed by: STUDENT IN AN ORGANIZED HEALTH CARE EDUCATION/TRAINING PROGRAM

## 2023-08-07 PROCEDURE — 96361 HYDRATE IV INFUSION ADD-ON: CPT

## 2023-08-07 PROCEDURE — 85610 PROTHROMBIN TIME: CPT | Performed by: STUDENT IN AN ORGANIZED HEALTH CARE EDUCATION/TRAINING PROGRAM

## 2023-08-07 PROCEDURE — 99285 EMERGENCY DEPT VISIT HI MDM: CPT | Mod: 25

## 2023-08-07 PROCEDURE — 96374 THER/PROPH/DIAG INJ IV PUSH: CPT

## 2023-08-07 PROCEDURE — 83735 ASSAY OF MAGNESIUM: CPT | Performed by: STUDENT IN AN ORGANIZED HEALTH CARE EDUCATION/TRAINING PROGRAM

## 2023-08-07 PROCEDURE — 83605 ASSAY OF LACTIC ACID: CPT | Performed by: STUDENT IN AN ORGANIZED HEALTH CARE EDUCATION/TRAINING PROGRAM

## 2023-08-07 PROCEDURE — 25000003 PHARM REV CODE 250: Performed by: STUDENT IN AN ORGANIZED HEALTH CARE EDUCATION/TRAINING PROGRAM

## 2023-08-07 PROCEDURE — 96375 TX/PRO/DX INJ NEW DRUG ADDON: CPT

## 2023-08-07 RX ORDER — KETOROLAC TROMETHAMINE 30 MG/ML
30 INJECTION, SOLUTION INTRAMUSCULAR; INTRAVENOUS
Status: COMPLETED | OUTPATIENT
Start: 2023-08-07 | End: 2023-08-07

## 2023-08-07 RX ORDER — NAPROXEN 500 MG/1
500 TABLET ORAL 2 TIMES DAILY PRN
Qty: 20 TABLET | Refills: 0 | OUTPATIENT
Start: 2023-08-07 | End: 2023-08-18

## 2023-08-07 RX ORDER — GLYCOPYRROLATE 1 MG/1
1 TABLET ORAL 3 TIMES DAILY PRN
Qty: 30 TABLET | Refills: 0 | Status: SHIPPED | OUTPATIENT
Start: 2023-08-07 | End: 2023-09-06 | Stop reason: ALTCHOICE

## 2023-08-07 RX ORDER — ONDANSETRON 2 MG/ML
4 INJECTION INTRAMUSCULAR; INTRAVENOUS
Status: COMPLETED | OUTPATIENT
Start: 2023-08-07 | End: 2023-08-07

## 2023-08-07 RX ORDER — MORPHINE SULFATE 2 MG/ML
4 INJECTION, SOLUTION INTRAMUSCULAR; INTRAVENOUS
Status: COMPLETED | OUTPATIENT
Start: 2023-08-07 | End: 2023-08-07

## 2023-08-07 RX ADMIN — SODIUM CHLORIDE 500 ML: 9 INJECTION, SOLUTION INTRAVENOUS at 03:08

## 2023-08-07 RX ADMIN — MORPHINE SULFATE 4 MG: 2 INJECTION, SOLUTION INTRAMUSCULAR; INTRAVENOUS at 03:08

## 2023-08-07 RX ADMIN — ONDANSETRON 4 MG: 2 INJECTION INTRAMUSCULAR; INTRAVENOUS at 03:08

## 2023-08-07 RX ADMIN — KETOROLAC TROMETHAMINE 30 MG: 30 INJECTION, SOLUTION INTRAMUSCULAR; INTRAVENOUS at 04:08

## 2023-08-07 NOTE — ED PROVIDER NOTES
Encounter Date: 8/7/2023       History     Chief Complaint   Patient presents with    Abdominal Pain     Hx of cirrhosis and gallstones, refused admission for sx last time, pt is still having pain and seeking admission.      Patient presents to the emergency department complaining of right upper quadrant pain.  She was actually seen 3 days ago in our ER for the same symptoms.  At that time she had a full workup evaluated by General surgery, she has cholelithiasis but no evidence of cholecystitis and was told to follow up in clinic.  She comes back complaining of the same pain.  She states it was not relieved with Motrin at home.  She denies any fevers nausea or vomiting or diarrhea.  She states she was previously on opiate medication but has recently moved here.    The history is provided by the patient.     Review of patient's allergies indicates:   Allergen Reactions    Gabapentin Swelling     No past medical history on file.  No past surgical history on file.  No family history on file.  Social History     Tobacco Use    Smoking status: Every Day     Current packs/day: 0.00     Types: Cigarettes    Smokeless tobacco: Never   Substance Use Topics    Alcohol use: Not Currently     Comment: QUIT X 1 MONTH.    Drug use: Yes     Types: Marijuana     Review of Systems   Constitutional:  Negative for chills and fever.   HENT:  Negative for congestion and sore throat.    Respiratory:  Negative for cough and shortness of breath.    Cardiovascular:  Negative for chest pain and palpitations.   Gastrointestinal:  Positive for abdominal pain. Negative for nausea.   Genitourinary:  Negative for dysuria and hematuria.   Musculoskeletal:  Negative for arthralgias and myalgias.   Neurological:  Negative for dizziness and weakness.       Physical Exam     Initial Vitals [08/07/23 1502]   BP Pulse Resp Temp SpO2   127/70 86 18 98.2 °F (36.8 °C) 100 %      MAP       --         Physical Exam    Nursing note and vitals  reviewed.  Constitutional: She appears well-developed and well-nourished.   HENT:   Head: Normocephalic and atraumatic.   Eyes: EOM are normal. Pupils are equal, round, and reactive to light.   Neck: Neck supple.   Normal range of motion.  Cardiovascular:  Normal rate and regular rhythm.           Pulmonary/Chest: Breath sounds normal. No respiratory distress.   Abdominal: Abdomen is soft. There is abdominal tenderness (tender in the right upper quadrant epigastric region). There is no rebound and no guarding.   Musculoskeletal:         General: No edema. Normal range of motion.      Cervical back: Normal range of motion and neck supple.     Neurological: She is alert and oriented to person, place, and time.   Skin: Skin is warm and dry.         ED Course   Procedures  Labs Reviewed   COMPREHENSIVE METABOLIC PANEL - Abnormal; Notable for the following components:       Result Value    Chloride 113 (*)     Carbon Dioxide 21 (*)     All other components within normal limits   URINALYSIS, REFLEX TO URINE CULTURE - Abnormal; Notable for the following components:    Protein, UA Trace (*)     Urobilinogen, UA 1+ (*)     Bacteria, UA Occ (*)     Squamous Epithelial Cells, UA Occ (*)     Mucous, UA Trace (*)     All other components within normal limits   CBC WITH DIFFERENTIAL - Abnormal; Notable for the following components:    WBC 3.58 (*)     Hgb 9.9 (*)     Hct 33.2 (*)     MCV 76.9 (*)     MCH 22.9 (*)     MCHC 29.8 (*)     RDW 18.9 (*)     Neut # 1.74 (*)     All other components within normal limits   PROTIME-INR - Normal   MAGNESIUM - Normal   LACTIC ACID, PLASMA - Normal   CBC W/ AUTO DIFFERENTIAL    Narrative:     The following orders were created for panel order CBC auto differential.  Procedure                               Abnormality         Status                     ---------                               -----------         ------                     CBC with Differential[[id number]]        Abnormal             Final result                 Please view results for these tests on the individual orders.   EXTRA TUBES    Narrative:     The following orders were created for panel order EXTRA TUBES.  Procedure                               Abnormality         Status                     ---------                               -----------         ------                     Gold Top Hold[[id number]]                                    In process                   Please view results for these tests on the individual orders.   GOLD TOP HOLD          Imaging Results              US Abdomen Limited_Gallbladder (Final result)  Result time 08/07/23 16:13:40      Final result by Matt Crawley MD (08/07/23 16:13:40)                   Narrative:    EXAMINATION  US ABDOMEN LIMITED_GALLBLADDER    CLINICAL HISTORY  RUQ pain, known stones;    TECHNIQUE  Multiplanar grayscale sonographic evaluation of the gallbladder and immediately adjacent right upper quadrant structures was performed.    COMPARISON  4 August 2023    FINDINGS  Exam quality: adequate for evaluation    Similar moderately dilated appearance of the gallbladder with layering echogenic sludge and multiple small stones.  No development of gallbladder wall thickening or pericholecystic fluid.  Sonographic Simpson sign is inconclusive secondary to administration of pain medication prior to imaging.    There are no findings to indicate abnormal biliary tree dilatation or obstructive CBD pathology.  The common bile duct measures approximately 4 mm in diameter.    Similar cirrhotic appearance of the liver.  No new or worsened focal abnormality of the visualized liver and right kidney identified.  Normal antegrade portal venous flow is again appreciated.    IMPRESSION  1. Similar findings of biliary sludge and cholelithiasis, without definite evidence of acute cholecystitis.  2. Remaining visualized right upper quadrant structures demonstrate no significant short interval  change.      Electronically signed by: Matt Crawley  Date:    08/07/2023  Time:    16:13                                     Medications   morphine injection 4 mg (4 mg Intravenous Given 8/7/23 1550)   sodium chloride 0.9% bolus 500 mL 500 mL (0 mLs Intravenous Stopped 8/7/23 1627)   ondansetron injection 4 mg (4 mg Intravenous Given 8/7/23 1550)   ketorolac injection 30 mg (30 mg Intramuscular Given 8/7/23 1651)     Medical Decision Making:   History:   Old Records Summarized: records from clinic visits.  Differential Diagnosis:   Cholecystitis, cholelithiasis, pancreatitis, among others  Clinical Tests:   Lab Tests: Ordered and Reviewed  The following lab test(s) were unremarkable: CBC, BMP and Urinalysis  Radiological Study: Ordered and Reviewed  ED Management:  Vital signs stable.  Lab workup is still reassuring, ultrasound shows no progression of her disease.  I am concerned the patient is just playing some pain seeking behavior she states she was previously on opiates and now she has not been on them since she is moved to a new stay.  We will avoid these at this time, we will prescribe Naprosyn and Robinul, and she is to follow back up with the general surgeons, return precautions were given                          Clinical Impression:   Final diagnoses:  [K80.20] Calculus of gallbladder without cholecystitis without obstruction (Primary)        ED Disposition Condition    Discharge Stable          ED Prescriptions       Medication Sig Dispense Start Date End Date Auth. Provider    naproxen (NAPROSYN) 500 MG tablet Take 1 tablet (500 mg total) by mouth 2 (two) times daily as needed (Pain). 20 tablet 8/7/2023 -- Merrill Joseph MD    glycopyrrolate (ROBINUL) 1 mg Tab Take 1 tablet (1 mg total) by mouth 3 (three) times daily as needed (Pain). 30 tablet 8/7/2023 9/6/2023 Merrill Joseph MD          Follow-up Information       Follow up With Specialties Details Why Contact Info    [facility]  Dept Emergency Medicine  If symptoms worsen [address] [facility] [address]  [phone]             Merrill Joseph MD  08/07/23 4342

## 2023-08-09 LAB
BACTERIA BLD CULT: NORMAL
BACTERIA BLD CULT: NORMAL

## 2023-08-18 ENCOUNTER — HOSPITAL ENCOUNTER (EMERGENCY)
Facility: HOSPITAL | Age: 53
Discharge: HOME | End: 2023-08-18
Attending: EMERGENCY MEDICINE
Payer: MEDICAID

## 2023-08-18 VITALS
OXYGEN SATURATION: 100 % | SYSTOLIC BLOOD PRESSURE: 110 MMHG | DIASTOLIC BLOOD PRESSURE: 68 MMHG | HEART RATE: 79 BPM | WEIGHT: 112.44 LBS | BODY MASS INDEX: 21.24 KG/M2 | RESPIRATION RATE: 18 BRPM | TEMPERATURE: 98 F

## 2023-08-18 DIAGNOSIS — M54.41 ACUTE RIGHT-SIDED LOW BACK PAIN WITH RIGHT-SIDED SCIATICA: Primary | ICD-10-CM

## 2023-08-18 PROCEDURE — 63600175 PHARM REV CODE 636 W HCPCS: Performed by: PHYSICIAN ASSISTANT

## 2023-08-18 PROCEDURE — 96372 THER/PROPH/DIAG INJ SC/IM: CPT | Performed by: PHYSICIAN ASSISTANT

## 2023-08-18 PROCEDURE — 99284 EMERGENCY DEPT VISIT MOD MDM: CPT

## 2023-08-18 RX ORDER — METHYLPREDNISOLONE 4 MG/1
TABLET ORAL
Qty: 1 EACH | Refills: 0 | Status: SHIPPED | OUTPATIENT
Start: 2023-08-18 | End: 2023-09-06 | Stop reason: ALTCHOICE

## 2023-08-18 RX ORDER — KETOROLAC TROMETHAMINE 30 MG/ML
30 INJECTION, SOLUTION INTRAMUSCULAR; INTRAVENOUS
Status: COMPLETED | OUTPATIENT
Start: 2023-08-18 | End: 2023-08-18

## 2023-08-18 RX ORDER — DICLOFENAC SODIUM 75 MG/1
75 TABLET, DELAYED RELEASE ORAL 2 TIMES DAILY
Qty: 20 TABLET | Refills: 0 | Status: SHIPPED | OUTPATIENT
Start: 2023-08-18 | End: 2023-09-06 | Stop reason: ALTCHOICE

## 2023-08-18 RX ADMIN — KETOROLAC TROMETHAMINE 30 MG: 30 INJECTION, SOLUTION INTRAMUSCULAR; INTRAVENOUS at 04:08

## 2023-08-18 NOTE — ED PROVIDER NOTES
Encounter Date: 8/18/2023       History     Chief Complaint   Patient presents with    Back Pain     PT IN /AASI W CO LOWER BACK PAIN W RAD DOWN LEGS X 3 WKS.  PT HOMELESS, REQUESTING FOOD AND SHOWER.  SANDWICH TRAY GIVEN.      51 YO WF in ER with complaints of lower back pain going down the right leg X 3 weeks. Denies injury/trauma, bladder/bowel dysfunction, fever, chills, chest pain, SOB, abdominal pain, N/V/D, HA or dizziness. No other complaints.     The history is provided by the patient.     Review of patient's allergies indicates:   Allergen Reactions    Gabapentin Swelling     History reviewed. No pertinent past medical history.  History reviewed. No pertinent surgical history.  History reviewed. No pertinent family history.  Social History     Tobacco Use    Smoking status: Every Day     Current packs/day: 0.00     Types: Cigarettes    Smokeless tobacco: Never   Substance Use Topics    Alcohol use: Yes     Comment: DAILY    Drug use: Yes     Types: Marijuana     Review of Systems   Constitutional:  Negative for chills and fever.   HENT:  Negative for congestion and sore throat.    Respiratory:  Negative for shortness of breath.    Cardiovascular:  Negative for chest pain.   Gastrointestinal:  Negative for abdominal pain, diarrhea, nausea and vomiting.   Genitourinary:  Negative for dysuria.   Musculoskeletal:  Positive for back pain.   Skin:  Negative for rash.   Neurological:  Negative for dizziness, weakness, light-headedness and headaches.   Hematological:  Does not bruise/bleed easily.   All other systems reviewed and are negative.      Physical Exam     Initial Vitals [08/18/23 1437]   BP Pulse Resp Temp SpO2   110/68 79 18 97.9 °F (36.6 °C) 100 %      MAP       --         Physical Exam    Nursing note and vitals reviewed.  Constitutional: She appears well-developed and well-nourished. She is not diaphoretic. No distress.   HENT:   Head: Normocephalic and atraumatic.   Nose: Nose normal.   Eyes:  Conjunctivae are normal.   Cardiovascular:  Normal rate and regular rhythm.           Pulmonary/Chest: Breath sounds normal.   Musculoskeletal:      Lumbar back: Spasms and tenderness present. No swelling, edema or bony tenderness. Decreased range of motion.        Back:      Neurological: She is alert and oriented to person, place, and time.   Skin: Skin is warm and dry.   Psychiatric: She has a normal mood and affect.         ED Course   Procedures  Labs Reviewed - No data to display       Imaging Results    None          Medications   ketorolac injection 30 mg (has no administration in time range)     Medical Decision Making  Risk  Prescription drug management.                               Clinical Impression:   Final diagnoses:  [M54.41] Acute right-sided low back pain with right-sided sciatica (Primary)        ED Disposition Condition    Discharge Stable          ED Prescriptions       Medication Sig Dispense Start Date End Date Auth. Provider    diclofenac (VOLTAREN) 75 MG EC tablet Take 1 tablet (75 mg total) by mouth 2 (two) times daily. 20 tablet 8/18/2023 -- Bolivar Serna PA    methylPREDNISolone (MEDROL DOSEPACK) 4 mg tablet Take as package instructs 1 each 8/18/2023 -- Bolivar Serna PA          Follow-up Information       Follow up With Specialties Details Why Contact Info    [facility] Dept Emergency Medicine In 3 days As needed, If symptoms worsen [facility] [facility] [address] [phone]    [facility] Internal Medicine On 9/6/2023 keep scheduled appointment [address] [facility] [address] [phone]             Bolivar Serna PA  08/18/23 3372

## 2023-08-19 ENCOUNTER — HOSPITAL ENCOUNTER (EMERGENCY)
Facility: HOSPITAL | Age: 53
Discharge: HOME | End: 2023-08-19
Attending: EMERGENCY MEDICINE
Payer: MEDICAID

## 2023-08-19 VITALS
DIASTOLIC BLOOD PRESSURE: 65 MMHG | HEART RATE: 75 BPM | RESPIRATION RATE: 20 BRPM | BODY MASS INDEX: 24.55 KG/M2 | OXYGEN SATURATION: 98 % | HEIGHT: 61 IN | SYSTOLIC BLOOD PRESSURE: 103 MMHG | WEIGHT: 130 LBS | TEMPERATURE: 98 F

## 2023-08-19 DIAGNOSIS — R53.1 WEAKNESS: ICD-10-CM

## 2023-08-19 LAB
ALBUMIN SERPL-MCNC: 4 G/DL (ref 3.5–5)
ALBUMIN/GLOB SERPL: 1.4 RATIO (ref 1.1–2)
ALP SERPL-CCNC: 134 UNIT/L (ref 40–150)
ALT SERPL-CCNC: 25 UNIT/L (ref 0–55)
AMPHET UR QL SCN: POSITIVE
AST SERPL-CCNC: 40 UNIT/L (ref 5–34)
BARBITURATE SCN PRESENT UR: NEGATIVE
BASOPHILS # BLD AUTO: 0.04 X10(3)/MCL
BASOPHILS NFR BLD AUTO: 1 %
BENZODIAZ UR QL SCN: POSITIVE
BILIRUB SERPL-MCNC: 1.8 MG/DL
BUN SERPL-MCNC: 13.1 MG/DL (ref 9.8–20.1)
CALCIUM SERPL-MCNC: 9.6 MG/DL (ref 8.4–10.2)
CANNABINOIDS UR QL SCN: POSITIVE
CHLORIDE SERPL-SCNC: 109 MMOL/L (ref 98–107)
CK SERPL-CCNC: 132 U/L (ref 29–168)
CO2 SERPL-SCNC: 24 MMOL/L (ref 22–29)
COCAINE UR QL SCN: NEGATIVE
CREAT SERPL-MCNC: 0.63 MG/DL (ref 0.55–1.02)
EOSINOPHIL # BLD AUTO: 0.12 X10(3)/MCL (ref 0–0.9)
EOSINOPHIL NFR BLD AUTO: 3.1 %
ERYTHROCYTE [DISTWIDTH] IN BLOOD BY AUTOMATED COUNT: 21.1 % (ref 11.5–17)
ETHANOL SERPL-MCNC: <10 MG/DL
FENTANYL UR QL SCN: NEGATIVE
GFR SERPLBLD CREATININE-BSD FMLA CKD-EPI: >60 MLS/MIN/1.73/M2
GLOBULIN SER-MCNC: 2.9 GM/DL (ref 2.4–3.5)
GLUCOSE SERPL-MCNC: 103 MG/DL (ref 74–100)
HCT VFR BLD AUTO: 34 % (ref 37–47)
HGB BLD-MCNC: 10.6 G/DL (ref 12–16)
IMM GRANULOCYTES # BLD AUTO: 0.01 X10(3)/MCL (ref 0–0.04)
IMM GRANULOCYTES NFR BLD AUTO: 0.3 %
LYMPHOCYTES # BLD AUTO: 1.41 X10(3)/MCL (ref 0.6–4.6)
LYMPHOCYTES NFR BLD AUTO: 36.4 %
MCH RBC QN AUTO: 23 PG (ref 27–31)
MCHC RBC AUTO-ENTMCNC: 31.2 G/DL (ref 33–36)
MCV RBC AUTO: 73.9 FL (ref 80–94)
MDMA UR QL SCN: NEGATIVE
MONOCYTES # BLD AUTO: 0.74 X10(3)/MCL (ref 0.1–1.3)
MONOCYTES NFR BLD AUTO: 19.1 %
NEUTROPHILS # BLD AUTO: 1.55 X10(3)/MCL (ref 2.1–9.2)
NEUTROPHILS NFR BLD AUTO: 40.1 %
NRBC BLD AUTO-RTO: 0 %
OPIATES UR QL SCN: NEGATIVE
PCP UR QL: NEGATIVE
PH UR: 6 [PH] (ref 3–11)
PLATELET # BLD AUTO: 202 X10(3)/MCL (ref 130–400)
PMV BLD AUTO: 10.1 FL (ref 7.4–10.4)
POTASSIUM SERPL-SCNC: 3.8 MMOL/L (ref 3.5–5.1)
PROT SERPL-MCNC: 6.9 GM/DL (ref 6.4–8.3)
RBC # BLD AUTO: 4.6 X10(6)/MCL (ref 4.2–5.4)
SODIUM SERPL-SCNC: 143 MMOL/L (ref 136–145)
TROPONIN I SERPL-MCNC: <0.01 NG/ML (ref 0–0.04)
WBC # SPEC AUTO: 3.87 X10(3)/MCL (ref 4.5–11.5)

## 2023-08-19 PROCEDURE — 82077 ASSAY SPEC XCP UR&BREATH IA: CPT | Performed by: EMERGENCY MEDICINE

## 2023-08-19 PROCEDURE — 93005 ELECTROCARDIOGRAM TRACING: CPT

## 2023-08-19 PROCEDURE — 80307 DRUG TEST PRSMV CHEM ANLYZR: CPT | Performed by: EMERGENCY MEDICINE

## 2023-08-19 PROCEDURE — 84484 ASSAY OF TROPONIN QUANT: CPT | Performed by: EMERGENCY MEDICINE

## 2023-08-19 PROCEDURE — 99284 EMERGENCY DEPT VISIT MOD MDM: CPT

## 2023-08-19 PROCEDURE — 85025 COMPLETE CBC W/AUTO DIFF WBC: CPT | Performed by: EMERGENCY MEDICINE

## 2023-08-19 PROCEDURE — 82550 ASSAY OF CK (CPK): CPT | Performed by: EMERGENCY MEDICINE

## 2023-08-19 PROCEDURE — 93010 EKG 12-LEAD: ICD-10-PCS | Mod: ,,, | Performed by: INTERNAL MEDICINE

## 2023-08-19 PROCEDURE — 93010 ELECTROCARDIOGRAM REPORT: CPT | Mod: ,,, | Performed by: INTERNAL MEDICINE

## 2023-08-19 PROCEDURE — 80053 COMPREHEN METABOLIC PANEL: CPT | Performed by: EMERGENCY MEDICINE

## 2023-08-19 NOTE — DISCHARGE INSTRUCTIONS

## 2023-08-19 NOTE — ED PROVIDER NOTES
"Encounter Date: 8/19/2023       History     Chief Complaint   Patient presents with    Fatigue     Fatigue and body aches "for a while". Pt reports "I just feel tired". Denies fever or any other complaints. Denies etoh or drug use     Patient was sleeping on the sidewalk 911 was called by a bystander who did not stay around patient was picked up said she was tired and to bring her to the emergency room.  Patient states she is just tired no chest pain or shortness of breath no nausea no vomiting, patient states she is eating well states that she does not drink or use drugs has been homeless for a little while.    The history is provided by the EMS personnel.     Review of patient's allergies indicates:   Allergen Reactions    Gabapentin Swelling     No past medical history on file.  No past surgical history on file.  No family history on file.  Social History     Tobacco Use    Smoking status: Every Day     Current packs/day: 0.00     Types: Cigarettes    Smokeless tobacco: Never   Substance Use Topics    Alcohol use: Yes     Comment: DAILY    Drug use: Yes     Types: Marijuana     Review of Systems   Unable to perform ROS: Other (Refusal)       Physical Exam     Initial Vitals [08/19/23 0536]   BP Pulse Resp Temp SpO2   102/78 68 18 98.4 °F (36.9 °C) 99 %      MAP       --         Physical Exam    Constitutional: She appears well-developed and well-nourished.   HENT:   Head: Normocephalic and atraumatic.   Mouth/Throat: Oropharynx is clear and moist.   Eyes: Conjunctivae are normal. Pupils are equal, round, and reactive to light.   Neck: Neck supple.   Normal range of motion.  Cardiovascular:  Normal rate, regular rhythm and normal heart sounds.           Pulmonary/Chest: Breath sounds normal. She has no wheezes. She has no rhonchi. She has no rales.   Abdominal: Abdomen is soft. Bowel sounds are normal.   Musculoskeletal:         General: Normal range of motion.      Cervical back: Normal range of motion and neck " supple.     Neurological: She is alert and oriented to person, place, and time. GCS score is 15. GCS eye subscore is 4. GCS verbal subscore is 5. GCS motor subscore is 6.   Patient wakes up answers all question and then goes back to sleep   Skin: Skin is warm and dry. Capillary refill takes less than 2 seconds.   Psychiatric: She has a normal mood and affect.         ED Course   Procedures  Labs Reviewed   COMPREHENSIVE METABOLIC PANEL - Abnormal; Notable for the following components:       Result Value    Chloride 109 (*)     Glucose Level 103 (*)     Bilirubin Total 1.8 (*)     Aspartate Aminotransferase 40 (*)     All other components within normal limits   DRUG SCREEN, URINE (BEAKER) - Abnormal; Notable for the following components:    Amphetamines, Urine Positive (*)     Benzodiazepine, Urine Positive (*)     Cannabinoids, Urine Positive (*)     All other components within normal limits    Narrative:     Cut off concentrations:    Amphetamines - 1000 ng/ml  Barbiturates - 200 ng/ml  Benzodiazepine - 200 ng/ml  Cannabinoids (THC) - 50 ng/ml  Cocaine - 300 ng/ml  Fentanyl - 1.0 ng/ml  MDMA - 500 ng/ml  Opiates - 300 ng/ml   Phencyclidine (PCP) - 25 ng/ml    Specimen submitted for drug analysis and tested for pH and specific gravity in order to evaluate sample integrity. Suspect tampering if specific gravity is <1.003 and/or pH is not within the range of 4.5 - 8.0  False negatives may result form substances such as bleach added to urine.  False positives may result for the presence of a substance with similar chemical structure to the drug or its metabolite.    This test provides only a PRELIMINARY analytical test result. A more specific alternate chemical method must be used in order to obtain a confirmed analytical result. Gas chromatography/mass spectrometry (GC/MS) is the preferred confirmatory method. Other chemical confirmation methods are available. Clinical consideration and professional judgement should  be applied to any drug of abuse test result, particularly when preliminary positive results are used.    Positive results will be confirmed only at the physicians request. Unconfirmed screening results are to be used only for medical purposes (treatment).        CBC WITH DIFFERENTIAL - Abnormal; Notable for the following components:    WBC 3.87 (*)     Hgb 10.6 (*)     Hct 34.0 (*)     MCV 73.9 (*)     MCH 23.0 (*)     MCHC 31.2 (*)     RDW 21.1 (*)     Neut # 1.55 (*)     All other components within normal limits   ALCOHOL,MEDICAL (ETHANOL) - Normal   TROPONIN I - Normal   CK - Normal   CBC W/ AUTO DIFFERENTIAL    Narrative:     The following orders were created for panel order CBC auto differential.  Procedure                               Abnormality         Status                     ---------                               -----------         ------                     CBC with Differential[[id number]]        Abnormal            Final result                 Please view results for these tests on the individual orders.          Imaging Results    None          Medications - No data to display  Medical Decision Making  Amount and/or Complexity of Data Reviewed  Labs: ordered.               ED Course as of 08/20/23 0453   Sat Aug 19, 2023   0946 Patient awake alert will check labs secondary to patient's age and complaints will also feed patient anticipate discharge [FK]      ED Course User Index  [FK] Shai Blackmon III, MD                    Clinical Impression:   Final diagnoses:  [R53.1] Weakness        ED Disposition Condition    Discharge Stable          ED Prescriptions    None       Follow-up Information       Follow up With Specialties Details Why Contact [facility]    Clinics, [facility]  Schedule an appointment as soon as possible for a visit   [address] [facility] [address] [phone]      [facility] - Emergency Dept Emergency Medicine  If symptoms worsen [address]  [facility] [address]  [phone]             Shai Blackmon III, MD  08/20/23 4205

## 2023-08-19 NOTE — ED NOTES
Pt ambulatory on discharge with steady gait. Provided discharge instructions and education. Pt questions answered. Ice pack given for back soreness.

## 2023-08-21 ENCOUNTER — PATIENT OUTREACH (OUTPATIENT)
Dept: EMERGENCY MEDICINE | Facility: HOSPITAL | Age: 53
End: 2023-08-21
Payer: MEDICAID

## 2023-08-22 ENCOUNTER — OFFICE VISIT (OUTPATIENT)
Dept: SURGERY | Facility: CLINIC | Age: 53
End: 2023-08-22
Payer: MEDICAID

## 2023-08-22 VITALS
OXYGEN SATURATION: 100 % | HEIGHT: 61 IN | DIASTOLIC BLOOD PRESSURE: 62 MMHG | WEIGHT: 116 LBS | HEART RATE: 69 BPM | RESPIRATION RATE: 19 BRPM | TEMPERATURE: 98 F | SYSTOLIC BLOOD PRESSURE: 97 MMHG | BODY MASS INDEX: 21.9 KG/M2

## 2023-08-22 DIAGNOSIS — R10.9 ABDOMINAL PAIN, UNSPECIFIED ABDOMINAL LOCATION: Primary | ICD-10-CM

## 2023-08-22 PROCEDURE — 99213 OFFICE O/P EST LOW 20 MIN: CPT | Mod: PBBFAC

## 2023-08-22 RX ORDER — LACTULOSE 10 G/15ML
30 SOLUTION ORAL; RECTAL 2 TIMES DAILY
COMMUNITY
Start: 2023-08-09 | End: 2023-09-06 | Stop reason: SDUPTHER

## 2023-08-22 RX ORDER — ALPRAZOLAM 0.5 MG/1
0.5 TABLET ORAL 3 TIMES DAILY PRN
COMMUNITY
Start: 2023-08-10 | End: 2023-09-06
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
## 2023-08-22 NOTE — PROGRESS NOTES
Pt seen by Dr. Long; Pt instructed to follow up as needed; Discharge paperwork given w/pt verbalizing understanding

## 2023-08-23 ENCOUNTER — PATIENT OUTREACH (OUTPATIENT)
Dept: EMERGENCY MEDICINE | Facility: HOSPITAL | Age: 53
End: 2023-08-23
Payer: MEDICAID

## 2023-08-23 NOTE — PROGRESS NOTES
I have reviewed the notes, assessments, and/or procedures performed by the resident, I concur with her/his documentation of Araseli Miranda.     Sandrita Hammond MD

## 2023-08-23 NOTE — PATIENT INSTRUCTIONS
Why Should I Have My Own Doctor or Nurse Practitioner (PCP) to Take Care of Me  What is a PCP (Primary Care Provider)?    A primary care provider is a doctor or nurse practitioner who you can call for an appointment and will see you when you are sick.    You will also be seen at scheduled appointment times during the year to check on your diabetes, or high blood pressure, or heart disease.    Why see the same PCP (doctor/nurse practitioner)?    You can be seen faster when you are sick           You, the PCP (doctor/nurse practitioner) and the office staff get to know each other; you begin to trust them to care for you. You take part in your health choices.   All of you together are a team.    Your medicine is looked at every time you visit, to be sure you are taking the medicine, as the PCP (doctor/nurse practitioner) ordered.    Your PCP (doctor/nurse practitioner) and their staff help keep you healthy and out of the hospital.  They can catch sicknesses earlier by ordering tests once a year to stop or prevent the sickness from getting worse.      Your PCP (doctor/nurse practitioner) can send you to providers who specialize (heart/bone/lung) if you need.  They and their office staff help keep track of your seeing other providers (doctors/nurse practitioners) and tests (CT/ MRIs/ X Rays)) taken.        PCPs want you to stay healthy.  Let us care for you.      DASH Meal Plan  (Healthy eating plan)    Why use this meal plan?    This healthy meal plan lowers blood pressure.  This meal plan lowers the chance of you getting Diabetes, heart disease and stroke.  This meal plan also helps you lose weight.    DASH balanced meal plannin or more servings of fruits and vegetables every day.  At each meal, try to fill half of your plated with fruits and vegetables                    Eat 6-8 servings of whole grains every day.  Whole grains are:  Brown rice, oatmeal, whole wheat bread, whole grain, low salt cereals, Xochitl  bread, whole wheat flour tortillas                5 ounces of meat, chicken, or fish each day (3-ounce size of serving of meat is the same size as a deck of playing cards)  Fish like sardines, salmon and mackerel are also good for your heart.            2 servings of low-fat dairy each day (Milk, cottage cheese, yogurt)          Do Not:  Use More than 1,500 milligrams of salt a day if you have high blood pressure (2/3 teaspoon)    You would look at labels and total milligrams of salt per day        Do Not Add salt when cooking      Do Not Salt food before eating    Do Not Eat fried foods  Do Not Cook using butter, stick margarine, lard, shortening, coconut oil    Do Not Buy regular canned vegetables, pickles, or olives (too much salt; use low salt or no salt)  Do Not Do not buy premade or frozen meals    Do Not Eat fast food/buffet           Speak with your Doctor/Nurse practitioner about exercising 30 minutes a day          Modified from DASH eating plan education  Need Help Getting to Your Clinic Appointments      Get Your Medications Delivered to You          Getting to your doctors/nurse practitioner office is important in keeping you healthy and keeping your daily activities in life.  If you stay healthy, you miss less work, you can take time off for things you want to do.   ASSISTANCE With Transportation:    [place] on [phone]  [place]:  [phone]    Delivery of Medications in [facility]              Non-Emergency Transportation: [employer]    Call Medi[employer]:  [phone]       Need help finding a place to stay?  Having a place to live helps lower stress on your body.   Added stress on the body can make sickness worsen.    Assistance with Housing:  EDH= ELDERLY DISABLE HOUSING  [facility] of the [facility] [phone]  Section 8 Offices [phone]: [place]                                  [phone]:  [place]                                   [phone]:  [place]    Women:  [facility]:  [phone]  Men:  [facility]:  [phone]  [facility]:  [phone]  [facility]: [phone] (Home for pregnant women in crisis)  ARCH ([facility] for [facility]elessness & Housing) Assist finding housing for ANYONE experiencing homelessness):              [phone]  [address] [place]  [place]  [phone]                          In-House Section 8 only  [facility] [address] [place]  [address] [phone]  [address] [place] [address] [phone]  [place] (ED) [address]  [place], [place]  [address] [phone]  [facility] (ED) [address] [facility] [place]  [address] [phone] (Subsidized)    [place] [address] [facility] [address] [place] [phone]    [facility] [address] [place]  [address] [phone]    [place] ([facility]) Business Office  [place], [place]  [id number] [phone]    [place] [address]  [place], [place]  [phone]    [place] [facility] [address] [place] [address]  [address] [phone] (8-12 M-TH; 8:30-11:30F) (Applications ONLY accepted on Fridays)    [address] (ED) [address] [place]  [address] [phone]    [address] (ED) [address] [place] [address] [phone] (Applicants must be 55+)    [place] [address] [place] [address] [phone]    [address] [place] [address] [phone]    [address] [facility]. [place] [address] [phone]    [facility]     [address] [facility] [place]  [phone]    [facility] ([facility]) [address] [facility] [place]  [address] [phone]     [facility] [address] [facility].  [place]  [phone]      [facility] [address]  [address] [facility].  [place]  [phone]     [address] [place]  [address] [phone]    [facility] [address] [facility] [facility] [facility]  [place]  [address] [phone] (wholly physically disabled)    [address] [place]  [address] [phone] (subsidized/accepts Section 8 Vouchers)    [address] (ED) [address] [facility] [address] [place]  [place]  [address] [phone] (applicants must be 62+)    [facility] ([facility]) [address] [facility] [place]  [place]  [address] [phone]    [facility] ([facility]) [address] [place], [place] [phone]    [facility] [address] [facility] [place]  [address] [phone]    [place] [address]  [place]  [place]  [phone] (section 8 vouchers only)    [address] ([facility]) [address] [place]  [place]  [address] [phone]    [address] (ED) [address] [place]  [address] [phone]    [address] [address] [place]  [address] [phone]    [address] [place]  [address] [phone]    [address] [address] [place]  [place]  [address] [phone]          [place] [employer] [facility] R[facility] Program    Why is it important to have healthy food to eat?    Not having enough healthy food for 3 meals will lower your body's power to fight against illness.  It also lowers your body's power to help keep you well if you have asthma, COPD, heart disease and diabetes.           What is the difference between not having enough food to eat and being hungry?    Not having enough food:  Running out of food in your house before you have enough money to buy more  Cutting the size of meals, skipping meals, or not eating for a whole day because of not having enough money for food  Eating less than you feel you should because you want to share or save the food for another meal/day  Hungry is when a person feels ill, weak or pain because they have not eaten in days        Assistance with Food    · Food net Food Bank [facility]: [phone]  · [facility]  ·[facility]  "[place]: [phone]  · [facility]: [phone]  · Meals on Wheels: [phone]  · Community Pantry (dry goods/ shelf stable food pantry) -[address]  · Community Fridge - [address] (dry goods/shelf stable food pantry)  · [facility]" on [address] [facility] (dry goods/shelf  stable food pantry)  · [facility]: [phone]  · [facility]: [phone]  · [employer] [place]: [phone]  Community food pantry in [place].   It is located under the pavilion directly behind [facility]                    School Age Children  Any family with school age kids (K-12) can sign up for a weekly food box to be delivered to their home ( anyone is eligible)  (Includes things like shelf stable milk/apple sauce/hummus/juice boxes/processed cheese snacks/pretzels/cookies/vegetable cups/tuna/chicken pouches)  Home Delivery = meal kit box including 7 days of snack and supper all shelf-stable delivered weekly to the parent/guardian's home  [employer], Lianne Kumari  www.focusfoods.us    "
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
## 2023-08-24 ENCOUNTER — PATIENT OUTREACH (OUTPATIENT)
Dept: EMERGENCY MEDICINE | Facility: HOSPITAL | Age: 53
End: 2023-08-24
Payer: MEDICAID

## 2023-08-24 NOTE — PROGRESS NOTES
Returned patient phone ,she wanted [facility] phone number text to her.Explained to patient the referral was just emailed to them for assistance so her case may not be open yet. Patient currently living in a shelter and she says her therapy dog is not allowed to be in side with her. She is also concern about her dog being out in the heat and not with her inside the shelter. Text [facility] contact info as well as her PCP appointment  09/06/23 info and Med[employer] phone number to set her ride for doctor visit.

## 2023-08-31 ENCOUNTER — PATIENT OUTREACH (OUTPATIENT)
Dept: EMERGENCY MEDICINE | Facility: HOSPITAL | Age: 53
End: 2023-08-31
Payer: MEDICAID

## 2023-09-01 ENCOUNTER — PATIENT OUTREACH (OUTPATIENT)
Dept: EMERGENCY MEDICINE | Facility: HOSPITAL | Age: 53
End: 2023-09-01
Payer: MEDICAID

## 2023-09-05 ENCOUNTER — PATIENT OUTREACH (OUTPATIENT)
Dept: EMERGENCY MEDICINE | Facility: HOSPITAL | Age: 53
End: 2023-09-05
Payer: MEDICAID

## 2023-09-06 ENCOUNTER — HOSPITAL ENCOUNTER (EMERGENCY)
Facility: HOSPITAL | Age: 53
Discharge: HOME | End: 2023-09-06
Attending: STUDENT IN AN ORGANIZED HEALTH CARE EDUCATION/TRAINING PROGRAM
Payer: MEDICAID

## 2023-09-06 ENCOUNTER — OFFICE VISIT (OUTPATIENT)
Dept: INTERNAL MEDICINE | Facility: CLINIC | Age: 53
End: 2023-09-06
Payer: MEDICAID

## 2023-09-06 VITALS
RESPIRATION RATE: 18 BRPM | TEMPERATURE: 98 F | DIASTOLIC BLOOD PRESSURE: 49 MMHG | OXYGEN SATURATION: 100 % | SYSTOLIC BLOOD PRESSURE: 101 MMHG | BODY MASS INDEX: 22.5 KG/M2 | HEART RATE: 98 BPM | WEIGHT: 119 LBS

## 2023-09-06 VITALS
WEIGHT: 119.63 LBS | RESPIRATION RATE: 18 BRPM | HEART RATE: 79 BPM | DIASTOLIC BLOOD PRESSURE: 65 MMHG | HEIGHT: 61 IN | BODY MASS INDEX: 22.58 KG/M2 | SYSTOLIC BLOOD PRESSURE: 98 MMHG | TEMPERATURE: 99 F

## 2023-09-06 DIAGNOSIS — Z11.3 ROUTINE SCREENING FOR STI (SEXUALLY TRANSMITTED INFECTION): ICD-10-CM

## 2023-09-06 DIAGNOSIS — Z13.220 SCREENING CHOLESTEROL LEVEL: ICD-10-CM

## 2023-09-06 DIAGNOSIS — F19.10 POLYSUBSTANCE ABUSE: Chronic | ICD-10-CM

## 2023-09-06 DIAGNOSIS — F17.200 TOBACCO DEPENDENCE: Chronic | ICD-10-CM

## 2023-09-06 DIAGNOSIS — G89.29 CHRONIC MIDLINE LOW BACK PAIN WITHOUT SCIATICA: Primary | ICD-10-CM

## 2023-09-06 DIAGNOSIS — M54.50 CHRONIC MIDLINE LOW BACK PAIN WITHOUT SCIATICA: ICD-10-CM

## 2023-09-06 DIAGNOSIS — R18.8 CIRRHOSIS OF LIVER WITH ASCITES, UNSPECIFIED HEPATIC CIRRHOSIS TYPE: Chronic | ICD-10-CM

## 2023-09-06 DIAGNOSIS — Z13.1 SCREENING FOR DIABETES MELLITUS: Primary | ICD-10-CM

## 2023-09-06 DIAGNOSIS — Z76.89 ENCOUNTER TO ESTABLISH CARE: ICD-10-CM

## 2023-09-06 DIAGNOSIS — M54.50 CHRONIC MIDLINE LOW BACK PAIN WITHOUT SCIATICA: Primary | ICD-10-CM

## 2023-09-06 DIAGNOSIS — Z13.29 SCREENING FOR THYROID DISORDER: ICD-10-CM

## 2023-09-06 DIAGNOSIS — Z59.00 HOMELESSNESS: ICD-10-CM

## 2023-09-06 DIAGNOSIS — K74.60 CIRRHOSIS OF LIVER WITH ASCITES, UNSPECIFIED HEPATIC CIRRHOSIS TYPE: Chronic | ICD-10-CM

## 2023-09-06 DIAGNOSIS — G89.29 CHRONIC MIDLINE LOW BACK PAIN WITHOUT SCIATICA: ICD-10-CM

## 2023-09-06 DIAGNOSIS — F41.9 ANXIETY AND DEPRESSION: Chronic | ICD-10-CM

## 2023-09-06 DIAGNOSIS — F32.A ANXIETY AND DEPRESSION: Chronic | ICD-10-CM

## 2023-09-06 LAB
ALBUMIN SERPL-MCNC: 3.7 G/DL (ref 3.5–5)
ALBUMIN/GLOB SERPL: 1.2 RATIO (ref 1.1–2)
ALP SERPL-CCNC: 128 UNIT/L (ref 40–150)
ALT SERPL-CCNC: 23 UNIT/L (ref 0–55)
ANISOCYTOSIS BLD QL SMEAR: SLIGHT
AST SERPL-CCNC: 28 UNIT/L (ref 5–34)
BASOPHILS # BLD AUTO: 0.01 X10(3)/MCL
BASOPHILS NFR BLD AUTO: 0.2 %
BILIRUB SERPL-MCNC: 1 MG/DL
BUN SERPL-MCNC: 11.2 MG/DL (ref 9.8–20.1)
CALCIUM SERPL-MCNC: 9.3 MG/DL (ref 8.4–10.2)
CHLORIDE SERPL-SCNC: 108 MMOL/L (ref 98–107)
CO2 SERPL-SCNC: 26 MMOL/L (ref 22–29)
CREAT SERPL-MCNC: 0.76 MG/DL (ref 0.55–1.02)
ELLIPTOCYTOSIS (OHS): SLIGHT
EOSINOPHIL # BLD AUTO: 0.1 X10(3)/MCL (ref 0–0.9)
EOSINOPHIL NFR BLD AUTO: 1.9 %
ERYTHROCYTE [DISTWIDTH] IN BLOOD BY AUTOMATED COUNT: 22.6 % (ref 11.5–17)
GFR SERPLBLD CREATININE-BSD FMLA CKD-EPI: >60 MLS/MIN/1.73/M2
GLOBULIN SER-MCNC: 3.2 GM/DL (ref 2.4–3.5)
GLUCOSE SERPL-MCNC: 170 MG/DL (ref 74–100)
HCT VFR BLD AUTO: 34 % (ref 37–47)
HGB BLD-MCNC: 10.5 G/DL (ref 12–16)
HYPOCHROMIA BLD QL SMEAR: SLIGHT
IMM GRANULOCYTES # BLD AUTO: 0.02 X10(3)/MCL (ref 0–0.04)
IMM GRANULOCYTES NFR BLD AUTO: 0.4 %
LIPASE SERPL-CCNC: 15 U/L
LYMPHOCYTES # BLD AUTO: 1.22 X10(3)/MCL (ref 0.6–4.6)
LYMPHOCYTES NFR BLD AUTO: 22.8 %
MACROCYTES BLD QL SMEAR: SLIGHT
MCH RBC QN AUTO: 23.9 PG (ref 27–31)
MCHC RBC AUTO-ENTMCNC: 30.9 G/DL (ref 33–36)
MCV RBC AUTO: 77.3 FL (ref 80–94)
MONOCYTES # BLD AUTO: 0.65 X10(3)/MCL (ref 0.1–1.3)
MONOCYTES NFR BLD AUTO: 12.1 %
NEUTROPHILS # BLD AUTO: 3.35 X10(3)/MCL (ref 2.1–9.2)
NEUTROPHILS NFR BLD AUTO: 62.6 %
NRBC BLD AUTO-RTO: 0 %
PLATELET # BLD AUTO: 144 X10(3)/MCL (ref 130–400)
PLATELET # BLD EST: ADEQUATE 10*3/UL
PMV BLD AUTO: 9.7 FL (ref 7.4–10.4)
POIKILOCYTOSIS BLD QL SMEAR: ABNORMAL
POTASSIUM SERPL-SCNC: 3.6 MMOL/L (ref 3.5–5.1)
PROT SERPL-MCNC: 6.9 GM/DL (ref 6.4–8.3)
RBC # BLD AUTO: 4.4 X10(6)/MCL (ref 4.2–5.4)
SCHISTOCYTE (OLG): ABNORMAL
SODIUM SERPL-SCNC: 141 MMOL/L (ref 136–145)
WBC # SPEC AUTO: 5.35 X10(3)/MCL (ref 4.5–11.5)

## 2023-09-06 PROCEDURE — 3008F PR BODY MASS INDEX (BMI) DOCUMENTED: ICD-10-PCS | Mod: CPTII,,, | Performed by: NURSE PRACTITIONER

## 2023-09-06 PROCEDURE — 99406 BEHAV CHNG SMOKING 3-10 MIN: CPT | Mod: S$PBB,,, | Performed by: NURSE PRACTITIONER

## 2023-09-06 PROCEDURE — 85025 COMPLETE CBC W/AUTO DIFF WBC: CPT | Performed by: STUDENT IN AN ORGANIZED HEALTH CARE EDUCATION/TRAINING PROGRAM

## 2023-09-06 PROCEDURE — 96374 THER/PROPH/DIAG INJ IV PUSH: CPT

## 2023-09-06 PROCEDURE — 1160F PR REVIEW ALL MEDS BY PRESCRIBER/CLIN PHARMACIST DOCUMENTED: ICD-10-PCS | Mod: CPTII,,, | Performed by: NURSE PRACTITIONER

## 2023-09-06 PROCEDURE — 99406 PR TOBACCO USE CESSATION INTERMEDIATE 3-10 MINUTES: ICD-10-PCS | Mod: S$PBB,,, | Performed by: NURSE PRACTITIONER

## 2023-09-06 PROCEDURE — 83690 ASSAY OF LIPASE: CPT | Performed by: STUDENT IN AN ORGANIZED HEALTH CARE EDUCATION/TRAINING PROGRAM

## 2023-09-06 PROCEDURE — 99204 PR OFFICE/OUTPT VISIT, NEW, LEVL IV, 45-59 MIN: ICD-10-PCS | Mod: 25,S$PBB,, | Performed by: NURSE PRACTITIONER

## 2023-09-06 PROCEDURE — 3074F SYST BP LT 130 MM HG: CPT | Mod: CPTII,,, | Performed by: NURSE PRACTITIONER

## 2023-09-06 PROCEDURE — 1160F RVW MEDS BY RX/DR IN RCRD: CPT | Mod: CPTII,,, | Performed by: NURSE PRACTITIONER

## 2023-09-06 PROCEDURE — 3078F DIAST BP <80 MM HG: CPT | Mod: CPTII,,, | Performed by: NURSE PRACTITIONER

## 2023-09-06 PROCEDURE — 99285 EMERGENCY DEPT VISIT HI MDM: CPT | Mod: 25,27

## 2023-09-06 PROCEDURE — 99215 OFFICE O/P EST HI 40 MIN: CPT | Mod: PBBFAC | Performed by: NURSE PRACTITIONER

## 2023-09-06 PROCEDURE — 1159F MED LIST DOCD IN RCRD: CPT | Mod: CPTII,,, | Performed by: NURSE PRACTITIONER

## 2023-09-06 PROCEDURE — 63600175 PHARM REV CODE 636 W HCPCS: Performed by: STUDENT IN AN ORGANIZED HEALTH CARE EDUCATION/TRAINING PROGRAM

## 2023-09-06 PROCEDURE — 99204 OFFICE O/P NEW MOD 45 MIN: CPT | Mod: 25,S$PBB,, | Performed by: NURSE PRACTITIONER

## 2023-09-06 PROCEDURE — 3008F BODY MASS INDEX DOCD: CPT | Mod: CPTII,,, | Performed by: NURSE PRACTITIONER

## 2023-09-06 PROCEDURE — 80053 COMPREHEN METABOLIC PANEL: CPT | Performed by: STUDENT IN AN ORGANIZED HEALTH CARE EDUCATION/TRAINING PROGRAM

## 2023-09-06 PROCEDURE — 3078F PR MOST RECENT DIASTOLIC BLOOD PRESSURE < 80 MM HG: ICD-10-PCS | Mod: CPTII,,, | Performed by: NURSE PRACTITIONER

## 2023-09-06 PROCEDURE — 3074F PR MOST RECENT SYSTOLIC BLOOD PRESSURE < 130 MM HG: ICD-10-PCS | Mod: CPTII,,, | Performed by: NURSE PRACTITIONER

## 2023-09-06 PROCEDURE — 1159F PR MEDICATION LIST DOCUMENTED IN MEDICAL RECORD: ICD-10-PCS | Mod: CPTII,,, | Performed by: NURSE PRACTITIONER

## 2023-09-06 RX ORDER — BACLOFEN 10 MG/1
10 TABLET ORAL 3 TIMES DAILY PRN
Qty: 30 TABLET | Refills: 0 | Status: SHIPPED | OUTPATIENT
Start: 2023-09-06

## 2023-09-06 RX ORDER — ESCITALOPRAM OXALATE 10 MG/1
10 TABLET ORAL DAILY
Qty: 30 TABLET | Refills: 1 | Status: SHIPPED | OUTPATIENT
Start: 2023-09-06

## 2023-09-06 RX ORDER — NABUMETONE 750 MG/1
750 TABLET, FILM COATED ORAL 2 TIMES DAILY PRN
Qty: 20 TABLET | Refills: 0 | Status: SHIPPED | OUTPATIENT
Start: 2023-09-06 | End: 2023-09-16

## 2023-09-06 RX ORDER — KETOROLAC TROMETHAMINE 30 MG/ML
15 INJECTION, SOLUTION INTRAMUSCULAR; INTRAVENOUS
Status: COMPLETED | OUTPATIENT
Start: 2023-09-06 | End: 2023-09-06

## 2023-09-06 RX ORDER — LACTULOSE 10 G/15ML
30 SOLUTION ORAL; RECTAL 2 TIMES DAILY
Qty: 946 ML | Refills: 2 | Status: SHIPPED | OUTPATIENT
Start: 2023-09-06

## 2023-09-06 RX ORDER — CYCLOBENZAPRINE HCL 10 MG
10 TABLET ORAL 3 TIMES DAILY PRN
Qty: 21 TABLET | Refills: 0 | Status: SHIPPED | OUTPATIENT
Start: 2023-09-06

## 2023-09-06 RX ADMIN — KETOROLAC TROMETHAMINE 15 MG: 30 INJECTION, SOLUTION INTRAMUSCULAR; INTRAVENOUS at 04:09

## 2023-09-06 SDOH — SOCIAL DETERMINANTS OF HEALTH (SDOH): HOMELESSNESS UNSPECIFIED: Z59.00

## 2023-09-06 NOTE — ED PROVIDER NOTES
Encounter Date: 2023       History     Chief Complaint   Patient presents with    Abdominal Pain     Hx of gallstones, pt reports some incontinence at times. Denies n/v at this time. Pt also asking for something to eat in triage.     Patient presents to the emergency department with multiple chronic complaints.  She actually saw her primary care nurse practitioner earlier today.  She has a history of cirrhosis, cholelithiasis, she has seen General surgery who recommends no surgery at this point in surgery does not think her pain is consistent with biliary colic.  Today she was complaining of some lower back pain that has been going on for a long time she states after a fall earlier in the year.  She is been she walks sometimes her back or spasm up and she would to lean over until it resolves.  She was no pain she would in her legs, no numbness in her legs or her groin, she does state she will feel like she had to urinate and then we urinated on herself.  No fevers.    The history is provided by the patient.     Review of patient's allergies indicates:   Allergen Reactions    Gabapentin Swelling     No past medical history on file.  Past Surgical History:   Procedure Laterality Date    AUGMENTATION OF BREAST      x 2 in  and      SECTION      x 2    COLONOSCOPY      ENDOSCOPY      NECK SURGERY      PARACENTESIS       No family history on file.  Social History     Tobacco Use    Smoking status: Every Day     Current packs/day: 0.25     Types: Cigarettes, Vaping with nicotine    Smokeless tobacco: Never    Tobacco comments:     Whatever she can get   Substance Use Topics    Alcohol use: Yes     Comment: Vodka DAILY, states quit 3 weeks ago    Drug use: Yes     Types: Marijuana     Comment: states quit 1 month ago     Review of Systems   Constitutional:  Negative for chills and fever.   HENT:  Negative for congestion and sore throat.    Respiratory:  Negative for cough and shortness of breath.     Cardiovascular:  Negative for chest pain and palpitations.   Gastrointestinal:  Negative for abdominal pain and nausea.   Genitourinary:  Negative for dysuria and hematuria.   Musculoskeletal:  Positive for back pain. Negative for arthralgias and myalgias.   Neurological:  Negative for dizziness and weakness.       Physical Exam     Initial Vitals [09/06/23 1319]   BP Pulse Resp Temp SpO2   (!) 101/49 98 18 98.2 °F (36.8 °C) 100 %      MAP       --         Physical Exam    Nursing note and vitals reviewed.  Constitutional: She appears well-developed and well-nourished.   HENT:   Head: Normocephalic and atraumatic.   Eyes: EOM are normal. Pupils are equal, round, and reactive to light.   Neck: Neck supple.   Normal range of motion.  Cardiovascular:  Normal rate and regular rhythm.           Pulmonary/Chest: Breath sounds normal. No respiratory distress.   Abdominal: Abdomen is soft. There is no abdominal tenderness.   Musculoskeletal:         General: No edema. Normal range of motion.      Cervical back: Normal range of motion and neck supple.     Neurological: She is alert and oriented to person, place, and time.   Skin: Skin is warm and dry.         ED Course   Procedures  Labs Reviewed   COMPREHENSIVE METABOLIC PANEL - Abnormal; Notable for the following components:       Result Value    Chloride 108 (*)     Glucose Level 170 (*)     All other components within normal limits   CBC WITH DIFFERENTIAL - Abnormal; Notable for the following components:    Hgb 10.5 (*)     Hct 34.0 (*)     MCV 77.3 (*)     MCH 23.9 (*)     MCHC 30.9 (*)     RDW 22.6 (*)     All other components within normal limits   BLOOD SMEAR MICROSCOPIC EXAM ([facility]) - Abnormal; Notable for the following components:    Anisocytosis Slight (*)     Elliptocytosis Slight (*)     Hypochromasia Slight (*)     Macrocytosis Slight (*)     Poikilocytosis 1+ (*)     Schistocytes 1+ (*)     All other components within normal limits   LIPASE - Normal   CBC W/  AUTO DIFFERENTIAL    Narrative:     The following orders were created for panel order CBC auto differential.  Procedure                               Abnormality         Status                     ---------                               -----------         ------                     CBC with Differential[[id number]]        Abnormal            Final result                 Please view results for these tests on the individual orders.   EXTRA TUBES    Narrative:     The following orders were created for panel order EXTRA TUBES.  Procedure                               Abnormality         Status                     ---------                               -----------         ------                     Light Blue Top Hold[[id number]]                              In process                 Gold Top Hold[[id number]]                                    In process                   Please view results for these tests on the individual orders.   LIGHT BLUE TOP HOLD   GOLD TOP HOLD          Imaging Results              CT Lumbar Spine Without Contrast (Final result)  Result time 09/06/23 15:49:06      Final result by Evaristo Dorman MD (09/06/23 15:49:06)                   Impression:      Lumbar degenerative disc disease and spondylosis level by level discussed above.      Electronically signed by: Evaristo Dorman  Date:    09/06/2023  Time:    15:49               Narrative:    EXAMINATION:  CT LUMBAR SPINE WITHOUT CONTRAST    CLINICAL HISTORY:  Back trauma, no prior imaging (Age >= 16y);    TECHNIQUE:  Multidetector axial images were performed of the lumbar spine without contrast and the images were reformatted.    Dose length product of 360 mGycm. Automated exposure control was utilized to minimize radiation dose.    COMPARISON:  None available    FINDINGS:  There are bilateral L1 rudimentary ribs.  For the purpose of this report, the most inferior well-formed intervertebral disc space is designated to be L5-S1.  There is L5-S1  degenerative disc disease with vacuum disc phenomena and sclerotic changes of the opposing endplates.  Lumbar vertebrae stature is preserved and the alignment is unremarkable.  Disc segmental analysis is given below:    At L1-L2, disc is unremarkable.  Central canal is not stenosed and there are no narrowings of the neural foramen.    At L2-L3, disc is unremarkable.  There is no central canal stenosis and there are no narrowings of the neural foramen.    At L3-L4, there is osteophyte disc complex which flattens and mildly compresses the ventral thecal sac.  Bilateral facet arthropathy and ligamentum flavum thickening.  These findings cause mild central canal stenosis.  There are no narrowings of the neural foramen.    At L4-L5, there is broad disc protrusion, ligamentum flavum thickening and facet arthropathy resulting in marked central canal stenosis.  Bilateral effacement of the lateral recesses.  Disc lateralizes and results in marked narrowing of right neural foramen.  There is also moderate narrowing of the proximal left neural foramen.    At L5-S1, broad osteophyte disc complex which causes ventral thecal sac compression.  There is also bilateral uncovertebral and facet arthropathy which is much more pronounced on the left.  These findings result in mild central canal stenosis.  There is moderate narrowing of right neural foramen and marked narrowing left neural foramen.                                       X-Ray Pelvis Routine AP (Final result)  Result time 09/06/23 15:42:51      Final result by Meena Rivera MD (09/06/23 15:42:51)                   Impression:      No displaced fracture identified.      Electronically signed by: Meena Rivera  Date:    09/06/2023  Time:    15:42               Narrative:    EXAMINATION:  XR PELVIS ROUTINE AP    CLINICAL HISTORY:  R pelvis pain;    COMPARISON:  None.    FINDINGS:  There is no displaced fracture identified.  The soft tissues are unremarkable.                                        Medications   ketorolac injection 15 mg (15 mg Intravenous Given 9/6/23 1600)     Medical Decision Making  Vital signs stable.  , she was ambulatory here in the ER, no saddle anesthesia, patient was placed were residual was only 35, no red flags to necessitate emergent MRI imaging today.  CT of the lumbar spine does show some degenerative changes and x-ray of the pelvis is unremarkable.  She was given a Toradol shot with relief of her symptoms.  CBC shows mild chronic anemia, CMP was generally unremarkable and lipase was unremarkable.  Patient states she was to take hydrocodone, she was multiple recent ER visits for pain, instructed her I can not write her opiates and knee she was to follow up with the primary care provider and try to get a pain management referral.  Return precautions were given.    Amount and/or Complexity of Data Reviewed  Labs: ordered. Decision-making details documented in ED Course.  Radiology: ordered. Decision-making details documented in ED Course.    Risk  Prescription drug management.                               Clinical Impression:   Final diagnoses:  [M54.50, G89.29] Chronic midline low back pain without sciatica (Primary)        ED Disposition Condition    Discharge Stable          ED Prescriptions       Medication Sig Dispense Start Date End Date Auth. Provider    cyclobenzaprine (FLEXERIL) 10 MG tablet Take 1 tablet (10 mg total) by mouth 3 (three) times daily as needed for Muscle spasms. 21 tablet 9/6/2023 -- Merrill Joseph MD          Follow-up Information       Follow up With Specialties Details Why Contact Info    Carrie Smith FNP Nurse Practitioner Call  For ED follow up [address] [facility]  Internal Medicine Clinic  [facility] [address]  [phone]      [facility] Emergency Medicine Go to  As needed [address] [facility] [id number] [phone]             Merrill Joseph MD  09/06/23  3838
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
